# Patient Record
Sex: FEMALE | Race: WHITE | NOT HISPANIC OR LATINO | Employment: OTHER | URBAN - METROPOLITAN AREA
[De-identification: names, ages, dates, MRNs, and addresses within clinical notes are randomized per-mention and may not be internally consistent; named-entity substitution may affect disease eponyms.]

---

## 2023-05-04 PROBLEM — Z72.0 TOBACCO ABUSE: Status: ACTIVE | Noted: 2023-05-04

## 2023-05-04 PROBLEM — C51.9 VULVAR CANCER: Status: ACTIVE | Noted: 2023-05-04

## 2023-05-04 NOTE — PROGRESS NOTES
Ellie Rajan  [id number]  1960      Reason for visit: Vulvar cancer    Consultation:  Patient is being seen at the request of Dr. Sultana Sampson/[facility]    History of present illness:  The patient is a 62 y.o. year old female who presents today for treatment and evaluation of the above issues.    Patient presented to [facility] with complaints of vulvar pruritus and burning.  She had previously failed treatment for yeast infection and herpes.  Cultures for yeast, HSV, trichomonas, chlamydia, gonorrhea, bacterial vaginosis were all negative.  She had a history of condyloma acuminata with removal 40 years prior.  Patient underwent biopsy of vulvar lesion 2023 which showed at least squamous cell carcinoma in situ, suspicious for invasion.  There was strong p16 positivity and Ki-67 was 3+.  She requested a delay in referral and presents today for further evaluation.  On review of the note, there is a pedunculated left labial mass and a right labial fourchette lesion as well.  Patient does have a history of tobacco abuse but quit smoking 4 years ago.  She is very anxious today and accompanied by her 2 nieces.  She notes significant vulvar pain and burning which has not responded to any medications including a variety of topical medications.  She is not sure when this process started, but first saw her primary care for this in 2023.  She has been prescribed a variety of agents including antifungals and antibacterials without any improvement.  Nothing is helped her symptoms as well.  For new patients, [facility] intake form from today was reviewed and confirmed.    OBGYN History:  She is a .  She does not use HRT. She does not have a history of abnormal pap smears.  Last Pap smear was 4 years ago.  Menopause age 52.    Oncologic History:  Oncology/Hematology History    No history exists.         Past Medical History:   Diagnosis Date   • Anxiety    • Depression    • Fatigue    • GERD  "(gastroesophageal reflux disease)    • Hypertension    • Migraine        No past surgical history on file.    MEDICATIONS: The current medication list was reviewed with the patient and updated in the EMR this date per the Medical Assistant. Medication dosages and frequencies were confirmed to be accurate.      Allergies:  is allergic to sulfa antibiotics.    Social History:   Social History     Socioeconomic History   • Marital status: Single       Family History:    Family History   Problem Relation Age of Onset   • COPD Father    • Stroke Father    • Stroke Mother    • Hypertension Mother    • Urinary tract infection Mother    • Hypertension Brother    • COPD Sister        Health Maintenance:    Health Maintenance   Topic Date Due   • MAMMOGRAM  Never done   • COLORECTAL CANCER SCREENING  Never done   • ZOSTER VACCINE (1 of 2) Never done   • TDAP/TD VACCINES (2 - Tdap) 07/24/2013   • COVID-19 Vaccine (3 - Booster for Moderna series) 12/01/2021   • HEPATITIS C SCREENING  Never done   • ANNUAL PHYSICAL  Never done   • INFLUENZA VACCINE  08/01/2023   • Pneumococcal Vaccine 0-64  Aged Out     Review of Systems  Chronic fatigue, change in vision, poor dentition, palpitations, high blood pressure, occasional depression/anxiety, reflux, change in urination, back pain/joint pain, rash (candidiasis), easy bruising, headaches  Please refer to history of present illness for further review of systems.  Review of systems otherwise negative.    Physical Exam    Vitals:    05/05/23 1409   BP: 171/74   Pulse: 62   Resp: 18   Temp: 97.5 °F (36.4 °C)   TempSrc: Temporal   SpO2: 97%   Weight: 119 kg (261 lb 14.4 oz)   Height: 170.2 cm (67\")   PainSc:   8   PainLoc: Groin       Body mass index is 41.02 kg/m².  Wt Readings from Last 3 Encounters:   05/05/23 119 kg (261 lb 14.4 oz)     GENERAL: Alert, well-appearing female appearing her stated age who is in no apparent distress.   HEENT: Sclera anicteric. Head normocephalic, " atraumatic. Mucus membranes moist.   NECK: Trachea midline, supple, without masses.  No thyromegaly.   BREASTS: Deferred  CARDIOVASCULAR: Normal rate, regular rhythm, no murmurs, rubs, or gallops.  No peripheral edema.  RESPIRATORY: Clear to auscultation bilaterally, normal respiratory effort  BACK:  No CVA tenderness, no vertebral tenderness on palpation  GASTROINTESTINAL:  Abdomen is soft, non-tender, non-distended, no rebound or guarding, no masses, or hernias. No HSM.   SKIN:  Warm, dry, well-perfused.  All visible areas intact.  No rashes, lesions, ulcers.  PSYCHIATRIC: AO x3, with appropriate affect, normal thought processes.  NEUROLOGIC: No focal deficits.  Moves extremities well.  MUSCULOSKELETAL: Normal gait and station.   EXTREMITIES:   No cyanosis, clubbing, symmetric.  LYMPHATICS:  No cervical or inguinal adenopathy noted.     PELVIC exam:    Genitourinary:        Vulvar lesion at the right witching compasses the whole right labia minora extends to the labia majora and there is an ulcerative component at the right vaginal introitus which is not well visualized due to poor tolerance of examination.  Urethral meatus unable to be evaluated.    ECOG PS 1    PROCEDURES:  none    Diagnostic Data:    No Images in the past 120 days found.    Lab Results   Component Value Date    WBC 8.73 05/05/2023    HGB 13.2 05/05/2023    HCT 40.2 05/05/2023    MCV 88.5 05/05/2023     05/05/2023    NEUTROABS 5.20 05/05/2023    GLUCOSE 94 05/05/2023    BUN 16 05/05/2023    CREATININE 0.66 05/05/2023     05/05/2023    K 3.5 05/05/2023     05/05/2023    CO2 28.0 05/05/2023    CALCIUM 9.5 05/05/2023    ALBUMIN 4.4 05/05/2023    AST 23 05/05/2023    ALT 34 (H) 05/05/2023    BILITOT 0.3 05/05/2023     No results found for:       Assessment & Plan   This is a 62 y.o. woman with a locally advanced vulvar cancer, clinical staging not possible due to intolerance of exam  Encounter Diagnoses   Name Primary?   •  Vulvar cancer Yes   • Anxiety    PET/CT was ordered to further assess extent of disease.  Examination under under anesthesia with possible biopsy planned.  Going to coordinate care with radiation oncology due to concern that this might not be an operable cancer.  I discussed the possibility of radiation as primary treatment with the patient and her family.  She lives about 2 hours from [place] and generally prefers [place] to [place] due to difficulties with driving and level.  They prefer to get treatment in [place] if possible.  After examination under anesthesia and further work-up completed, will come up with a comprehensive treatment plan.  I did discuss this case with Dr. Fowler, radiation oncologist, who was available today.  [place] radiology would be a good option for the patient.  She might need radiation sensitizing chemotherapy as well.  This could be a challenge due to national platinum shortage with cisplatin and being utilized as a substitute for carboplatin.  However, this is all yet to be determined.  After further schedule coordination, exam under anesthesia to be performed with Dr. Manpreet Eugene with radiation oncology 5/15/2023.    Pain assessment was performed today as a part of patient’s care.  For patients with pain related to surgery, gynecologic malignancy or cancer treatment, the plan is as noted in the assessment/plan.  For patients with pain not related to these issues, they are to seek any further needed care from a more appropriate provider, such as PCP.      Orders Placed This Encounter   Procedures   • NM PET/CT Skull Base to Mid Thigh     Standing Status:   Future     Standing Expiration Date:   5/5/2024     Order Specific Question:   Release to patient     Answer:   Routine Release     Order Specific Question:   What radiopharmaceutical is preferred for this exam?     Answer:   FDG  (offered at all sites)   • Comprehensive Metabolic Panel     Standing Status:    Future     Number of Occurrences:   1     Standing Expiration Date:   5/5/2024     Order Specific Question:   Release to patient     Answer:   Routine Release   • Verify NPO Status     Standing Status:   Future   • Obtain Informed Consent     Standing Status:   Future     Order Specific Question:   Informed Consent Given For     Answer:   EXAM UNDER ANESTHESIA, POSSIBLE BIOPSY   • Provide Instructions to Patient on NPO Status     Standing Status:   Future   • Chlorhexidine Skin Prep     Chlorhexidine Skin Prep and Instructions For All Patients Having A Procedure Requiring an Outward Incision if Not Allergic. If Allergic, Give Antibacterial Skin Wipes and Instructions. Do Not Use For Facial Cases or on Any Mucus Membranes.     Standing Status:   Future   • Instruct Patient on Coughing, Deep Breathing & Incentive Spirometry     Standing Status:   Future   • ECG 12 Lead     Standing Status:   Future     Standing Expiration Date:   5/5/2024     Order Specific Question:   Reason for Exam:     Answer:   PRE-OP     Order Specific Question:   Release to patient     Answer:   Routine Release   • CBC & Differential     Standing Status:   Future     Number of Occurrences:   1     Standing Expiration Date:   5/5/2024     Order Specific Question:   Manual Differential     Answer:   No     Order Specific Question:   Release to patient     Answer:   Routine Release       FOLLOW UP: Examination under anesthesia.    I spent 60 minutes caring for Ellie on this date of service. This time includes time spent by me in the following activities: preparing for the visit, reviewing tests, performing a medically appropriate examination and/or evaluation, counseling and educating the patient/family/caregiver, ordering medications, tests, or procedures, referring and communicating with other health care professionals, documenting information in the medical record and care coordination    Electronically Signed by: Maxine Sanches MD  Date:  5/5/2023        done today

## 2023-05-04 NOTE — H&P (VIEW-ONLY)
Ellie Rajan  [id number]  1960      Reason for visit: Vulvar cancer    Consultation:  Patient is being seen at the request of Dr. Sultana Sampson/[facility]    History of present illness:  The patient is a 62 y.o. year old female who presents today for treatment and evaluation of the above issues.    Patient presented to [facility] with complaints of vulvar pruritus and burning.  She had previously failed treatment for yeast infection and herpes.  Cultures for yeast, HSV, trichomonas, chlamydia, gonorrhea, bacterial vaginosis were all negative.  She had a history of condyloma acuminata with removal 40 years prior.  Patient underwent biopsy of vulvar lesion 2023 which showed at least squamous cell carcinoma in situ, suspicious for invasion.  There was strong p16 positivity and Ki-67 was 3+.  She requested a delay in referral and presents today for further evaluation.  On review of the note, there is a pedunculated left labial mass and a right labial fourchette lesion as well.  Patient does have a history of tobacco abuse but quit smoking 4 years ago.  She is very anxious today and accompanied by her 2 nieces.  She notes significant vulvar pain and burning which has not responded to any medications including a variety of topical medications.  She is not sure when this process started, but first saw her primary care for this in 2023.  She has been prescribed a variety of agents including antifungals and antibacterials without any improvement.  Nothing is helped her symptoms as well.  For new patients, [facility] intake form from today was reviewed and confirmed.    OBGYN History:  She is a .  She does not use HRT. She does not have a history of abnormal pap smears.  Last Pap smear was 4 years ago.  Menopause age 52.    Oncologic History:  Oncology/Hematology History    No history exists.         Past Medical History:   Diagnosis Date   • Anxiety    • Depression    • Fatigue    • GERD  "(gastroesophageal reflux disease)    • Hypertension    • Migraine        No past surgical history on file.    MEDICATIONS: The current medication list was reviewed with the patient and updated in the EMR this date per the Medical Assistant. Medication dosages and frequencies were confirmed to be accurate.      Allergies:  is allergic to sulfa antibiotics.    Social History:   Social History     Socioeconomic History   • Marital status: Single       Family History:    Family History   Problem Relation Age of Onset   • COPD Father    • Stroke Father    • Stroke Mother    • Hypertension Mother    • Urinary tract infection Mother    • Hypertension Brother    • COPD Sister        Health Maintenance:    Health Maintenance   Topic Date Due   • MAMMOGRAM  Never done   • COLORECTAL CANCER SCREENING  Never done   • ZOSTER VACCINE (1 of 2) Never done   • TDAP/TD VACCINES (2 - Tdap) 07/24/2013   • COVID-19 Vaccine (3 - Booster for Moderna series) 12/01/2021   • HEPATITIS C SCREENING  Never done   • ANNUAL PHYSICAL  Never done   • INFLUENZA VACCINE  08/01/2023   • Pneumococcal Vaccine 0-64  Aged Out     Review of Systems  Chronic fatigue, change in vision, poor dentition, palpitations, high blood pressure, occasional depression/anxiety, reflux, change in urination, back pain/joint pain, rash (candidiasis), easy bruising, headaches  Please refer to history of present illness for further review of systems.  Review of systems otherwise negative.    Physical Exam    Vitals:    05/05/23 1409   BP: 171/74   Pulse: 62   Resp: 18   Temp: 97.5 °F (36.4 °C)   TempSrc: Temporal   SpO2: 97%   Weight: 119 kg (261 lb 14.4 oz)   Height: 170.2 cm (67\")   PainSc:   8   PainLoc: Groin       Body mass index is 41.02 kg/m².  Wt Readings from Last 3 Encounters:   05/05/23 119 kg (261 lb 14.4 oz)     GENERAL: Alert, well-appearing female appearing her stated age who is in no apparent distress.   HEENT: Sclera anicteric. Head normocephalic, " atraumatic. Mucus membranes moist.   NECK: Trachea midline, supple, without masses.  No thyromegaly.   BREASTS: Deferred  CARDIOVASCULAR: Normal rate, regular rhythm, no murmurs, rubs, or gallops.  No peripheral edema.  RESPIRATORY: Clear to auscultation bilaterally, normal respiratory effort  BACK:  No CVA tenderness, no vertebral tenderness on palpation  GASTROINTESTINAL:  Abdomen is soft, non-tender, non-distended, no rebound or guarding, no masses, or hernias. No HSM.   SKIN:  Warm, dry, well-perfused.  All visible areas intact.  No rashes, lesions, ulcers.  PSYCHIATRIC: AO x3, with appropriate affect, normal thought processes.  NEUROLOGIC: No focal deficits.  Moves extremities well.  MUSCULOSKELETAL: Normal gait and station.   EXTREMITIES:   No cyanosis, clubbing, symmetric.  LYMPHATICS:  No cervical or inguinal adenopathy noted.     PELVIC exam:    Genitourinary:        Vulvar lesion at the right witching compasses the whole right labia minora extends to the labia majora and there is an ulcerative component at the right vaginal introitus which is not well visualized due to poor tolerance of examination.  Urethral meatus unable to be evaluated.    ECOG PS 1    PROCEDURES:  none    Diagnostic Data:    No Images in the past 120 days found.    Lab Results   Component Value Date    WBC 8.73 05/05/2023    HGB 13.2 05/05/2023    HCT 40.2 05/05/2023    MCV 88.5 05/05/2023     05/05/2023    NEUTROABS 5.20 05/05/2023    GLUCOSE 94 05/05/2023    BUN 16 05/05/2023    CREATININE 0.66 05/05/2023     05/05/2023    K 3.5 05/05/2023     05/05/2023    CO2 28.0 05/05/2023    CALCIUM 9.5 05/05/2023    ALBUMIN 4.4 05/05/2023    AST 23 05/05/2023    ALT 34 (H) 05/05/2023    BILITOT 0.3 05/05/2023     No results found for:       Assessment & Plan   This is a 62 y.o. woman with a locally advanced vulvar cancer, clinical staging not possible due to intolerance of exam  Encounter Diagnoses   Name Primary?   •  Vulvar cancer Yes   • Anxiety    PET/CT was ordered to further assess extent of disease.  Examination under under anesthesia with possible biopsy planned.  Going to coordinate care with radiation oncology due to concern that this might not be an operable cancer.  I discussed the possibility of radiation as primary treatment with the patient and her family.  She lives about 2 hours from [place] and generally prefers [place] to [place] due to difficulties with driving and level.  They prefer to get treatment in [place] if possible.  After examination under anesthesia and further work-up completed, will come up with a comprehensive treatment plan.  I did discuss this case with Dr. Fowler, radiation oncologist, who was available today.  [place] radiology would be a good option for the patient.  She might need radiation sensitizing chemotherapy as well.  This could be a challenge due to national platinum shortage with cisplatin and being utilized as a substitute for carboplatin.  However, this is all yet to be determined.  After further schedule coordination, exam under anesthesia to be performed with Dr. Manpreet Eugene with radiation oncology 5/15/2023.    Pain assessment was performed today as a part of patient’s care.  For patients with pain related to surgery, gynecologic malignancy or cancer treatment, the plan is as noted in the assessment/plan.  For patients with pain not related to these issues, they are to seek any further needed care from a more appropriate provider, such as PCP.      Orders Placed This Encounter   Procedures   • NM PET/CT Skull Base to Mid Thigh     Standing Status:   Future     Standing Expiration Date:   5/5/2024     Order Specific Question:   Release to patient     Answer:   Routine Release     Order Specific Question:   What radiopharmaceutical is preferred for this exam?     Answer:   FDG  (offered at all sites)   • Comprehensive Metabolic Panel     Standing Status:    Future     Number of Occurrences:   1     Standing Expiration Date:   5/5/2024     Order Specific Question:   Release to patient     Answer:   Routine Release   • Verify NPO Status     Standing Status:   Future   • Obtain Informed Consent     Standing Status:   Future     Order Specific Question:   Informed Consent Given For     Answer:   EXAM UNDER ANESTHESIA, POSSIBLE BIOPSY   • Provide Instructions to Patient on NPO Status     Standing Status:   Future   • Chlorhexidine Skin Prep     Chlorhexidine Skin Prep and Instructions For All Patients Having A Procedure Requiring an Outward Incision if Not Allergic. If Allergic, Give Antibacterial Skin Wipes and Instructions. Do Not Use For Facial Cases or on Any Mucus Membranes.     Standing Status:   Future   • Instruct Patient on Coughing, Deep Breathing & Incentive Spirometry     Standing Status:   Future   • ECG 12 Lead     Standing Status:   Future     Standing Expiration Date:   5/5/2024     Order Specific Question:   Reason for Exam:     Answer:   PRE-OP     Order Specific Question:   Release to patient     Answer:   Routine Release   • CBC & Differential     Standing Status:   Future     Number of Occurrences:   1     Standing Expiration Date:   5/5/2024     Order Specific Question:   Manual Differential     Answer:   No     Order Specific Question:   Release to patient     Answer:   Routine Release       FOLLOW UP: Examination under anesthesia.    I spent 60 minutes caring for Ellie on this date of service. This time includes time spent by me in the following activities: preparing for the visit, reviewing tests, performing a medically appropriate examination and/or evaluation, counseling and educating the patient/family/caregiver, ordering medications, tests, or procedures, referring and communicating with other health care professionals, documenting information in the medical record and care coordination    Electronically Signed by: Maxine Sanches MD  Date:  5/5/2023

## 2023-05-05 ENCOUNTER — OFFICE VISIT (OUTPATIENT)
Dept: GYNECOLOGIC ONCOLOGY | Facility: CLINIC | Age: 63
End: 2023-05-05
Payer: MEDICAID

## 2023-05-05 ENCOUNTER — LAB (OUTPATIENT)
Dept: LAB | Facility: HOSPITAL | Age: 63
End: 2023-05-05
Payer: MEDICAID

## 2023-05-05 VITALS
DIASTOLIC BLOOD PRESSURE: 74 MMHG | HEIGHT: 67 IN | BODY MASS INDEX: 41.11 KG/M2 | WEIGHT: 261.9 LBS | TEMPERATURE: 97.5 F | HEART RATE: 62 BPM | RESPIRATION RATE: 18 BRPM | OXYGEN SATURATION: 97 % | SYSTOLIC BLOOD PRESSURE: 171 MMHG

## 2023-05-05 DIAGNOSIS — F41.9 ANXIETY: ICD-10-CM

## 2023-05-05 DIAGNOSIS — F41.9 ANXIETY: Primary | ICD-10-CM

## 2023-05-05 DIAGNOSIS — C51.9 VULVAR CANCER: Primary | ICD-10-CM

## 2023-05-05 DIAGNOSIS — C51.9 VULVAR CANCER: ICD-10-CM

## 2023-05-05 LAB
ALBUMIN SERPL-MCNC: 4.4 G/DL (ref 3.5–5.2)
ALBUMIN/GLOB SERPL: 1.5 G/DL
ALP SERPL-CCNC: 65 U/L (ref 39–117)
ALT SERPL W P-5'-P-CCNC: 34 U/L (ref 1–33)
ANION GAP SERPL CALCULATED.3IONS-SCNC: 10 MMOL/L (ref 5–15)
AST SERPL-CCNC: 23 U/L (ref 1–32)
BASOPHILS # BLD AUTO: 0.06 10*3/MM3 (ref 0–0.2)
BASOPHILS NFR BLD AUTO: 0.7 % (ref 0–1.5)
BILIRUB SERPL-MCNC: 0.3 MG/DL (ref 0–1.2)
BUN SERPL-MCNC: 16 MG/DL (ref 8–23)
BUN/CREAT SERPL: 24.2 (ref 7–25)
CALCIUM SPEC-SCNC: 9.5 MG/DL (ref 8.6–10.5)
CHLORIDE SERPL-SCNC: 100 MMOL/L (ref 98–107)
CO2 SERPL-SCNC: 28 MMOL/L (ref 22–29)
CREAT SERPL-MCNC: 0.66 MG/DL (ref 0.57–1)
DEPRECATED RDW RBC AUTO: 43.6 FL (ref 37–54)
EGFRCR SERPLBLD CKD-EPI 2021: 100.6 ML/MIN/1.73
EOSINOPHIL # BLD AUTO: 0.11 10*3/MM3 (ref 0–0.4)
EOSINOPHIL NFR BLD AUTO: 1.3 % (ref 0.3–6.2)
ERYTHROCYTE [DISTWIDTH] IN BLOOD BY AUTOMATED COUNT: 13.4 % (ref 12.3–15.4)
GLOBULIN UR ELPH-MCNC: 3 GM/DL
GLUCOSE SERPL-MCNC: 94 MG/DL (ref 65–99)
HCT VFR BLD AUTO: 40.2 % (ref 34–46.6)
HGB BLD-MCNC: 13.2 G/DL (ref 12–15.9)
IMM GRANULOCYTES # BLD AUTO: 0.09 10*3/MM3 (ref 0–0.05)
IMM GRANULOCYTES NFR BLD AUTO: 1 % (ref 0–0.5)
LYMPHOCYTES # BLD AUTO: 2.55 10*3/MM3 (ref 0.7–3.1)
LYMPHOCYTES NFR BLD AUTO: 29.2 % (ref 19.6–45.3)
MCH RBC QN AUTO: 29.1 PG (ref 26.6–33)
MCHC RBC AUTO-ENTMCNC: 32.8 G/DL (ref 31.5–35.7)
MCV RBC AUTO: 88.5 FL (ref 79–97)
MONOCYTES # BLD AUTO: 0.72 10*3/MM3 (ref 0.1–0.9)
MONOCYTES NFR BLD AUTO: 8.2 % (ref 5–12)
NEUTROPHILS NFR BLD AUTO: 5.2 10*3/MM3 (ref 1.7–7)
NEUTROPHILS NFR BLD AUTO: 59.6 % (ref 42.7–76)
NRBC BLD AUTO-RTO: 0 /100 WBC (ref 0–0.2)
PLATELET # BLD AUTO: 241 10*3/MM3 (ref 140–450)
PMV BLD AUTO: 10.8 FL (ref 6–12)
POTASSIUM SERPL-SCNC: 3.5 MMOL/L (ref 3.5–5.2)
PROT SERPL-MCNC: 7.4 G/DL (ref 6–8.5)
RBC # BLD AUTO: 4.54 10*6/MM3 (ref 3.77–5.28)
SODIUM SERPL-SCNC: 138 MMOL/L (ref 136–145)
WBC NRBC COR # BLD: 8.73 10*3/MM3 (ref 3.4–10.8)

## 2023-05-05 PROCEDURE — 80053 COMPREHEN METABOLIC PANEL: CPT

## 2023-05-05 PROCEDURE — 85025 COMPLETE CBC W/AUTO DIFF WBC: CPT

## 2023-05-05 PROCEDURE — 36415 COLL VENOUS BLD VENIPUNCTURE: CPT

## 2023-05-05 RX ORDER — IBUPROFEN 800 MG/1
800 TABLET ORAL 2 TIMES DAILY PRN
COMMUNITY
Start: 2023-03-10

## 2023-05-05 RX ORDER — HEPARIN SODIUM 5000 [USP'U]/ML
5000 INJECTION, SOLUTION INTRAVENOUS; SUBCUTANEOUS ONCE
OUTPATIENT
Start: 2023-05-05 | End: 2023-05-05

## 2023-05-05 RX ORDER — SODIUM CHLORIDE 0.9 % (FLUSH) 0.9 %
10 SYRINGE (ML) INJECTION EVERY 12 HOURS SCHEDULED
OUTPATIENT
Start: 2023-05-05

## 2023-05-05 RX ORDER — CHLORAL HYDRATE 500 MG
2 CAPSULE ORAL EVERY 12 HOURS SCHEDULED
COMMUNITY
Start: 2023-02-06

## 2023-05-05 RX ORDER — SODIUM CHLORIDE 0.9 % (FLUSH) 0.9 %
10 SYRINGE (ML) INJECTION AS NEEDED
OUTPATIENT
Start: 2023-05-05

## 2023-05-05 RX ORDER — NYSTATIN 100000 [USP'U]/G
POWDER TOPICAL
COMMUNITY
Start: 2023-04-03

## 2023-05-05 RX ORDER — PREGABALIN 75 MG/1
150 CAPSULE ORAL ONCE
OUTPATIENT
Start: 2023-05-05 | End: 2023-05-05

## 2023-05-05 RX ORDER — HYDROCHLOROTHIAZIDE 12.5 MG/1
1 TABLET ORAL DAILY
COMMUNITY
Start: 2023-03-10

## 2023-05-05 RX ORDER — ACETAMINOPHEN 500 MG
1000 TABLET ORAL ONCE
OUTPATIENT
Start: 2023-05-05 | End: 2023-05-05

## 2023-05-05 RX ORDER — SODIUM CHLORIDE 9 MG/ML
40 INJECTION, SOLUTION INTRAVENOUS AS NEEDED
OUTPATIENT
Start: 2023-05-05

## 2023-05-05 RX ORDER — CELECOXIB 100 MG/1
200 CAPSULE ORAL ONCE
OUTPATIENT
Start: 2023-05-05 | End: 2023-05-05

## 2023-05-05 RX ORDER — ESCITALOPRAM OXALATE 10 MG/1
1 TABLET ORAL DAILY
COMMUNITY
Start: 2023-04-04

## 2023-05-05 RX ORDER — LORAZEPAM 1 MG/1
1 TABLET ORAL EVERY 8 HOURS PRN
Qty: 1 TABLET | Refills: 0 | Status: SHIPPED | OUTPATIENT
Start: 2023-05-05

## 2023-05-05 RX ORDER — OXYCODONE HYDROCHLORIDE 5 MG/1
5 TABLET ORAL ONCE
OUTPATIENT
Start: 2023-05-05 | End: 2023-05-05

## 2023-05-05 RX ORDER — VALACYCLOVIR HYDROCHLORIDE 1 G/1
1 TABLET, FILM COATED ORAL 3 TIMES DAILY
COMMUNITY
Start: 2023-03-25

## 2023-05-05 RX ORDER — ATORVASTATIN CALCIUM 20 MG/1
20 TABLET, FILM COATED ORAL
COMMUNITY
Start: 2023-04-21

## 2023-05-05 RX ORDER — LORAZEPAM 1 MG/1
1 TABLET ORAL EVERY 8 HOURS PRN
Qty: 1 TABLET | Refills: 0 | Status: CANCELLED | OUTPATIENT
Start: 2023-05-05 | End: 2023-05-06

## 2023-05-05 RX ORDER — LEVOTHYROXINE SODIUM 0.03 MG/1
1 TABLET ORAL DAILY
COMMUNITY
Start: 2023-04-20

## 2023-05-05 NOTE — PATIENT INSTRUCTIONS
Surgery Instructions            Ellie Rajan  [id number]  1960      SURGEON:  Myron    Surgery Coordinator: Laura HERR    Gynecological Oncology  [address] [facility] [address] [place] [address], [facility]  Phone: [phone]                   Fax: [phone]      Pre-Admission Testing Appointment      We understand your time is valuable. To prevent delays, please bring the following to your PAT apt. if it applies to you:  Written physician orders (if given to you by your physician)  All medications in the original bottles including over-the-counter medications (not a list)  Copy of living will or power of  documents   Copy of recent test results (EKG, stress test, echo, heart cath, etc.)  Copy of pacemaker or ICD cards and date of last interrogation   Copy of cardiac clearance letter from your cardiologist or primary care physician if history of heart problems  Name and phone number of your pharmacy, primary care physician and/or cardiologist  CPAP or BiPAP settings    Surgery Appointment     Your surgery has been scheduled on 5/15/2023.  You will need to go to Main Registration to check in at 0730. Then you will be sent to the [address] [place] [address] floor surgery registration desk to check in.    Nothing by mouth after midnight on 5/14/2023.    If you are feeling sick, have a fever or cough and have seen your PCP let our office know 48 hours prior to surgery. It may be subject to rescheduling.       The Day of Surgery:    Do not chew gum or tobacco, smoke, or eat mints or hard candy. Shower and wash your hair. You may brush your teeth but do not swallow water. Use any wipes that Pre-admission testing has given you.     Please arrive for surgery as instructed by the pre-op nurse, often one to two hours before your surgery.  Once you are called to go to your pre-op room, no one will be allowed in the pre op room.   Please note no one under age 12 is permitted to stay in the  waiting area without supervision.  Remove all jewelry, including rings and piercings. Do not bring valuables to the hospital.  Wear loose-fitting clothing.  Avoid wearing eye makeup or contact lenses  We make every effort to begin surgery at your scheduled start time but delays do occur. We will keep you and your family updated about any delays  Please note: you MUST have a  over the age of 18 to drive you home from the hospital. You may not use Uber, Lyft or a taxi.    Please remember to bring:    Photo ID and current medical insurance card  Advanced directives, living will or power of  (if applicable)  Current list of all medications, including over-the- counter and herbal supplements  List of allergies  CPAP device if you have sleep apnea  Any assistive devices or equipment needed after surgery    While You are In the Pre-Op Room:  The nurse will review your health history and will place an IV (into the vein) in your hand or arm for fluids and medicines.  An anesthesia provider will talk with you about anesthesia and pain control during and after surgery.  A member of the surgical team can answer your questions.    Directions to [facility]  [address] [facility] ? [place], [place] [address] ? [phone]    From [address] and [address] [facility]:  Take [address] to the [place] exit. Go right on [address] to [address]. Right on [address] to [facility].   Left on [facility] to [facility] which is on the left.    From [address] [facility]:  Get off [address] at the [place] exit. Go left on [address] to [address]. Right on [address] to [facility]. Left on   [facility] to [facility] [place] [facility] which is on the left.     From the South ([address]):  Follow [address] to approximately one mile inside [facility]. [facility] is on the right at [facility] and   [facility].     Parking:  Free  Parking -  Take Entrance 2 off of [address] and go straight ahead to [address] [place] [address].  Self Parking - Take Entrance 1 off of [address], bear left and follow the road to [facility].    Directions to Registration:  If entering through front of [address] [place] [address] ( parking), take a right and proceed up the hallway connecting [address] [place] [address] to   [facility]. Registration is on the left about nursing home up the lyon.    If entering from [facility], take garage elevator to first floor (1), exit to the right and proceed through the doors to outside, follow the covered sidewalk to entrance of [facility], follow signs to [address] [place] [address], this leads to the [facility] lobby and information desk. Proceed past the information desk to the hallway that connects [address] [place] [address] to the [facility]. Registration is on the left about nursing home up the ly[address].    Directions to Pre-admission Testing:  Follow directions to Registration and Pre-admission Testing is next door to Registration             PREPARING FOR SURGERY  **Disability or Work Release Forms     Work: The amount of time you will be off work after surgery depends on both your surgery and your job. Discuss this with your doctor before surgery. If you have any questions about this, call your doctor.  You must provide all forms completed and signed to the GYN ONCOLOGY office.    FORM FOR AUHTHORIZATION FOR USE AND/OR DISCLOSURE OF PROCTED HEALTH INFORMATION CAN BE PROVIDED UPON REQUEST.    Preoperative Evaluation and Optimization  If your doctor tells you to get a preoperative evaluation from your primary care provider, cardiologist, or other specialist, it is your responsibility to make sure to complete these well before your surgery. We want you to get evaluated to make sure you are as healthy as possible when you have your surgery. If the evaluation, including all recommended testing, is not done in time, your surgery will be postponed.    If you take diabetic medications please  "consult with the prescriber.  Continue antidepressants, Beta Blockers \"olol\", anti-seizure medication, GERD medication (heartburn), Opioids and Parkinson's medication.  Let us know if you have a history of blood clots or are taking a blood thinner before your surgery, this will need to be held and you will need to discuss this with staff.   You are allowed 1 visitor that may remain in the waiting room at both locations.  Visitors cannot come back to pre-op or post-op areas.    Please note: you MUST have a  over the age of 18 to drive you home from the hospital. You may not use Uber, Lyft or a taxi.    Physical Fitness  Research shows that getting more physical activity before surgery can lower your risk for problems after surgery. Walking is a great way to improve your fitness level before surgery. Even if you start walking just a few weeks before surgery, it can make a big difference.     Quit Smoking  If you smoke, your risk of having a lung problem is at least twice that of a non-smoker.    Surgical incisions will not heal as well and you have a higher risk of infection  The heart has to work harder.  It is best to quit smoking 6 to 8 weeks before surgery. This gives your lungs more time to recover.    Outpatient Surgery  You will need to have someone bring you to the hospital, stay in the waiting room during your procedure and take you home at discharge. It is recommended that someone stay with you 24 hours after your procedure.     If you live more than a 4-hour drive away from the hospital, or live in an area without easy access to an emergency department, we recommend you plan to spend another night or two close to the hospital before you go home. For assistance with hotel, prices and vouchers let our office know and we can let you talk with our Oncology Social worker, Althea Pires.     Post-Operative Visit  You will be scheduled a post-operative appointment for 3 weeks after your surgical procedure. " If you do not have an appointment please call the office and have that scheduled.     How to prevent nausea  The best way to prevent nausea is to eat frequent small meals. It is especially important to eat something before taking pain medication. Take your ondansetron if you are feeling nauseous do not wait.    Pain Management after Surgery    If you have kidney disease or liver disease and are not to take ibuprofen or Tylenol please let your doctor or nurse know.     Driving: Do not drive while you are taking prescription pain medications.     It is normal to have some pain after surgery. The goal of managing your acute pain after surgery is to minimize your pain so you feel comfortable enough to get up, take deep breaths, wash, get dressed, and do simple tasks in your home. Some discomfort is likely. We do not expect you to be completely free of pain.   Pain is usually worst the first 24-48 hours after surgery.    What can I do to relieve pain without medications?   Apply heat with a warm compress, hot water bottle, or heating pad. Do not put anything hot directly on your skin or lie on top of it.   Apply cooling with a cold gel pack, bag of peas, or crushed ice. Wrap in a soft cloth or towel.   Do not push or press on your incision. It is normal for your incision to be sore for up to 6 weeks if you push on it.   Unless your doctor gives you a different plan, ibuprofen and acetaminophen are the main medicines you will use to manage your pain.   You may also get a prescription for an opioid such as oxycodone or hydrocodone. Opioids should only be added as needed to reduce pain that is not adequately relieved by ibuprofen and acetaminophen.                                                                                         Typical Pain Medication Schedule  6 am Ibuprofen 600 mg   9 am acetaminophen 650 mg   12:00 pm Noon Ibuprofen 600 mg   3:00 pm Acetaminophen 650 mg   6:00 pm Ibuprofen 600 mg   9:00 pm  Acetaminophen 650 mg   12:00 am Midnight  Ibuprofen 600 mg.      What if this schedule does not control my pain?   Please call the office and let us know at [phone]  Reduce the number and frequency of opioids as soon as you can. Do not take more opioid medication than your doctor has prescribed.   Common side effects and risks of opioids include drowsiness, mental confusion, dizziness, nausea, constipation, itching, dry mouth, and slowed breathing.   Never mix opioids with alcohol, sleep aids or anti-anxiety medications. These are dangerous combinations that increase the harmful effects of opioid pain medication. Many overdose deaths from opioids also involve at least one other drug or alcohol.   It is illegal to sell or share an opioid without a prescription properly issued by a licensed health care prescriber.    What is the best way to stop taking pain medications?  1. Stop opioid use.  2. Stop acetaminophen.  3. Gradually decrease how often you take ibuprofen. It is a good idea to take a 600 mg pill before you start a more tiring activity such as going shopping or for a long walk.  Once you get more active, you may have a day when your pain gets a little worse. If this happens, take ibuprofen. If ibuprofen does not relieve the pain, add acetaminophen.    What do I need to know about bowel movements?   Starting as soon as you get home, take 17 grams of Miralax (one capful) twice a day to keep your stool soft and prevent constipation. It is important to prevent constipation because straining can damage your stitches. Your stool should be as soft as toothpaste. If your stool gets too loose, cut back to using Miralax only once a day.   If you used a bowel prep before surgery, it is common not to have a bowel movement on the first and second day after surgery.   If you have not had a bowel movement by 7 p.m. on the third day after surgery, do one of the following at bedtime:  Drink 1 ounce (2 tablespoons) of  Milk of Magnesia (MOM). If you have used MOM before and know you need to take 2 ounces for it to work for you, it is OK to do this, or Take 2 Senekot tablets.   Go for short walks. Walking and being active will help you have a bowel movement.   If you have not had a bowel movement by noon on the fourth day after surgery, call the clinic where you were seen and ask to speak with a nurse.    What kind of vaginal bleeding is normal?  Spotting of pink or red blood from the vagina is normal. Brown-colored discharge that gradually changes to a light yellow or cream color is also normal and can last up to 6 weeks. The brownish discharge is old blood and often has a strong odor, this is okay. Call us if it becomes heavier or foul smelling or you are saturating a maxi pad within an hour.     At Home after Surgery: If you experience a medical emergency call 911 or have someone drive you to your nearest emergency department.     When should I call my doctor?  Call your doctor right away, any time of the day or night, including on weekends and holidays, if you have any of the following signs or symptoms:   A temperature over 100.4°F (38°C) If you don't have one, please buy a thermometer before your surgery.   Heavy bleeding (soaking a regular pad in an hour or less)   Severe pain in your abdomen or pelvis that the pain medication is not helping   Chest pain or difficulty breathing   Swelling, redness, or pain in your legs   An incision that opens   An incision that is red or hot   Fluid or blood leaking from an incision   New bruising after leaving the hospital that is large or spreading. A little bit of bruising around an incision is normal.   Nausea and vomiting    Skin rash   Unable to urinate at all   Pain or stinging when you pass urine   Blood or cloudiness in your urine   Non-stop urge to pass urine, but only dribbling when you try to go   A sense that something is wrong.    Caring for post-surgical incisions     Do not  have vaginal intercourse until your doctor evaluates you at a postop visit and tells you OK.     Showers: You may shower starting 24 hours after your surgery.    NO BATHS: do not take a tub bath up to 6 weeks after surgery.   Do not put any lotion, oil, gel, or powder on or near your incisions.     For incisions inside your vagina: Incisions inside the vagina are closed with dissolvable stitches. When they dissolve you may see little bits of suture material that look like thin pieces of string on your underwear or on toilet tissue after wiping. This is normal. Do not put anything inside the vagina until your doctor evaluates you at a postop visit and tells you when it will be OK.      For incisions on your skin: If there is a dressing over the incision, remove it before your first shower. Leave the slim adhesive strips that are under the dressing in place. During the week after surgery, they will usually curl up at the edges and then come off on their own. If they are still there a week after surgery, gently remove them.  To clean the incisions, first wash your hands, and then get your hands sudsy with soap and gently wash or let the sudsy water run down over the incisions. Dry the incisions well after washing by gently patting with a towel. You may use a blow dryer, but it must be on a low-heat setting.    When will my bladder function get back to normal?   You received extra fluid through your I.V. while you were in the hospital, so it is normal to urinate (pee) more than usual when you first get home.   It is normal for your bladder function to be different after surgery. You may notice a pause before your urine stream starts or that your urine stream is slower. This will gradually get better, but it may take up to 6 months before you are back to normal. Be patient, relax, and sit on the toilet a little longer.   Drinking more water than usual will not help the bladder recover faster.    What is a normal energy  level?  It is normal to have a decreased energy level after surgery. Listen to your body. If you need to rest, do it. Give yourself permission to take it easy. Once you settle into a normal routine at home, you will find that you slowly begin to feel better. Walking around the house and taking short walks outside will help you get back to normal.    What kind of exercise/activities can I do?   Exercise is important for a healthy recovery. We encourage you to begin normal physical activity, like walking, within hours of surgery. Start with short walks and gradually increase the distance and length of time that you walk.   Allow your body time to heal. Do not restart a difficult exercise routine until you have had your post-op exam and your doctor says it is OK.   Lifting: Unless you are given other instructions, for 6 weeks after your surgery do not lift anything over 15 pounds.   Travel: It is best if you do not go far away from home before your postop visit with your doctor. If you have travel plans, talk to your doctor about this before your surgery.      Financial Assistance:    If you have any questions or need assistance, contact your [facility] financial counseling office from 8:30 a.m.-4:30  p.m. Monday through Friday. Closed weekends.   [place]: [phone] or, or visit at [address] [facility], [address], near the entrance.  Financial Assistance Application available upon request      Patient Payments and Correspondence  Customer service representatives are available to assist you from 8:00 a.m. to 6:00 p.m. Eastern Standard Time by calling [phone] Monday through Friday. You can also contact us through [employer].    [facility]  [id number]  [place] [address] [phone]

## 2023-05-08 NOTE — SIGNIFICANT NOTE
Database initiated. Pt unable to do a med rec during call. I instructed to bring pictures of med bottles to preop

## 2023-05-10 ENCOUNTER — TELEPHONE (OUTPATIENT)
Dept: GYNECOLOGIC ONCOLOGY | Facility: CLINIC | Age: 63
End: 2023-05-10
Payer: MEDICAID

## 2023-05-10 DIAGNOSIS — C51.8 MALIGNANT NEOPLASM OF OVERLAPPING SITES OF VULVA: Primary | ICD-10-CM

## 2023-05-10 NOTE — TELEPHONE ENCOUNTER
Disability Determination Paperwork received 05/01/2023 with 1st appt with provider scheduled for May 5, 2023. Requested information faxed to requested number on 05/09/2023 and place in scan folder for addition to patients medical records.

## 2023-05-14 ENCOUNTER — ANESTHESIA EVENT (OUTPATIENT)
Dept: PERIOP | Facility: HOSPITAL | Age: 63
End: 2023-05-14
Payer: MEDICAID

## 2023-05-14 RX ORDER — FAMOTIDINE 10 MG/ML
20 INJECTION, SOLUTION INTRAVENOUS ONCE
Status: CANCELLED | OUTPATIENT
Start: 2023-05-14 | End: 2023-05-14

## 2023-05-15 ENCOUNTER — ANESTHESIA (OUTPATIENT)
Dept: PERIOP | Facility: HOSPITAL | Age: 63
End: 2023-05-15
Payer: MEDICAID

## 2023-05-15 ENCOUNTER — HOSPITAL ENCOUNTER (OUTPATIENT)
Facility: HOSPITAL | Age: 63
Discharge: HOME | End: 2023-05-15
Attending: OBSTETRICS & GYNECOLOGY | Admitting: OBSTETRICS & GYNECOLOGY
Payer: MEDICAID

## 2023-05-15 VITALS
TEMPERATURE: 97.5 F | WEIGHT: 261 LBS | HEART RATE: 56 BPM | BODY MASS INDEX: 40.97 KG/M2 | DIASTOLIC BLOOD PRESSURE: 66 MMHG | RESPIRATION RATE: 18 BRPM | SYSTOLIC BLOOD PRESSURE: 136 MMHG | OXYGEN SATURATION: 92 % | HEIGHT: 67 IN

## 2023-05-15 DIAGNOSIS — C51.9 VULVAR CANCER: ICD-10-CM

## 2023-05-15 LAB
QT INTERVAL: 462 MS
QTC INTERVAL: 472 MS

## 2023-05-15 PROCEDURE — 25010000002 FENTANYL CITRATE (PF) 50 MCG/ML SOLUTION

## 2023-05-15 PROCEDURE — 88321 CONSLTJ&REPRT SLD PREP ELSWR: CPT

## 2023-05-15 PROCEDURE — 25010000002 DEXAMETHASONE PER 1 MG: Performed by: NURSE ANESTHETIST, CERTIFIED REGISTERED

## 2023-05-15 PROCEDURE — 88305 TISSUE EXAM BY PATHOLOGIST: CPT | Performed by: OBSTETRICS & GYNECOLOGY

## 2023-05-15 PROCEDURE — 88342 IMHCHEM/IMCYTCHM 1ST ANTB: CPT | Performed by: OBSTETRICS & GYNECOLOGY

## 2023-05-15 PROCEDURE — 88360 TUMOR IMMUNOHISTOCHEM/MANUAL: CPT | Performed by: OBSTETRICS & GYNECOLOGY

## 2023-05-15 PROCEDURE — 25010000002 ONDANSETRON PER 1 MG: Performed by: NURSE ANESTHETIST, CERTIFIED REGISTERED

## 2023-05-15 PROCEDURE — 25010000002 FENTANYL CITRATE (PF) 100 MCG/2ML SOLUTION: Performed by: NURSE ANESTHETIST, CERTIFIED REGISTERED

## 2023-05-15 PROCEDURE — 25010000002 CEFAZOLIN PER 500 MG: Performed by: NURSE ANESTHETIST, CERTIFIED REGISTERED

## 2023-05-15 PROCEDURE — 93005 ELECTROCARDIOGRAM TRACING: CPT | Performed by: ANESTHESIOLOGY

## 2023-05-15 PROCEDURE — 25010000002 PROPOFOL 10 MG/ML EMULSION: Performed by: NURSE ANESTHETIST, CERTIFIED REGISTERED

## 2023-05-15 PROCEDURE — 25010000002 SUGAMMADEX 200 MG/2ML SOLUTION: Performed by: NURSE ANESTHETIST, CERTIFIED REGISTERED

## 2023-05-15 RX ORDER — MAGNESIUM HYDROXIDE 1200 MG/15ML
LIQUID ORAL AS NEEDED
Status: DISCONTINUED | OUTPATIENT
Start: 2023-05-15 | End: 2023-05-15 | Stop reason: HOSPADM

## 2023-05-15 RX ORDER — CEFAZOLIN SODIUM 1 G/3ML
INJECTION, POWDER, FOR SOLUTION INTRAMUSCULAR; INTRAVENOUS AS NEEDED
Status: DISCONTINUED | OUTPATIENT
Start: 2023-05-15 | End: 2023-05-15 | Stop reason: SURG

## 2023-05-15 RX ORDER — OXYCODONE HYDROCHLORIDE 5 MG/1
5 TABLET ORAL ONCE
Status: COMPLETED | OUTPATIENT
Start: 2023-05-15 | End: 2023-05-15

## 2023-05-15 RX ORDER — ACETAMINOPHEN 500 MG
1000 TABLET ORAL ONCE
Status: COMPLETED | OUTPATIENT
Start: 2023-05-15 | End: 2023-05-15

## 2023-05-15 RX ORDER — FENTANYL CITRATE 50 UG/ML
50 INJECTION, SOLUTION INTRAMUSCULAR; INTRAVENOUS
Status: DISCONTINUED | OUTPATIENT
Start: 2023-05-15 | End: 2023-05-15 | Stop reason: HOSPADM

## 2023-05-15 RX ORDER — LIDOCAINE HYDROCHLORIDE 10 MG/ML
0.5 INJECTION, SOLUTION EPIDURAL; INFILTRATION; INTRACAUDAL; PERINEURAL ONCE AS NEEDED
Status: COMPLETED | OUTPATIENT
Start: 2023-05-15 | End: 2023-05-15

## 2023-05-15 RX ORDER — LIDOCAINE HYDROCHLORIDE 10 MG/ML
INJECTION, SOLUTION EPIDURAL; INFILTRATION; INTRACAUDAL; PERINEURAL AS NEEDED
Status: DISCONTINUED | OUTPATIENT
Start: 2023-05-15 | End: 2023-05-15 | Stop reason: SURG

## 2023-05-15 RX ORDER — ONDANSETRON 2 MG/ML
4 INJECTION INTRAMUSCULAR; INTRAVENOUS ONCE AS NEEDED
Status: DISCONTINUED | OUTPATIENT
Start: 2023-05-15 | End: 2023-05-15 | Stop reason: HOSPADM

## 2023-05-15 RX ORDER — IBUPROFEN 600 MG/1
600 TABLET ORAL EVERY 6 HOURS PRN
Qty: 30 TABLET | Refills: 1 | Status: SHIPPED | OUTPATIENT
Start: 2023-05-15

## 2023-05-15 RX ORDER — SODIUM CHLORIDE 9 MG/ML
40 INJECTION, SOLUTION INTRAVENOUS AS NEEDED
Status: DISCONTINUED | OUTPATIENT
Start: 2023-05-15 | End: 2023-05-15 | Stop reason: HOSPADM

## 2023-05-15 RX ORDER — SODIUM CHLORIDE, SODIUM LACTATE, POTASSIUM CHLORIDE, CALCIUM CHLORIDE 600; 310; 30; 20 MG/100ML; MG/100ML; MG/100ML; MG/100ML
INJECTION, SOLUTION INTRAVENOUS CONTINUOUS PRN
Status: DISCONTINUED | OUTPATIENT
Start: 2023-05-15 | End: 2023-05-15 | Stop reason: SURG

## 2023-05-15 RX ORDER — BUPIVACAINE HYDROCHLORIDE AND EPINEPHRINE 2.5; 5 MG/ML; UG/ML
INJECTION, SOLUTION EPIDURAL; INFILTRATION; INTRACAUDAL; PERINEURAL AS NEEDED
Status: DISCONTINUED | OUTPATIENT
Start: 2023-05-15 | End: 2023-05-15 | Stop reason: HOSPADM

## 2023-05-15 RX ORDER — NALOXONE HYDROCHLORIDE 4 MG/.1ML
SPRAY NASAL
Qty: 2 EACH | Refills: 0 | Status: SHIPPED | OUTPATIENT
Start: 2023-05-15

## 2023-05-15 RX ORDER — PROPOFOL 10 MG/ML
VIAL (ML) INTRAVENOUS AS NEEDED
Status: DISCONTINUED | OUTPATIENT
Start: 2023-05-15 | End: 2023-05-15 | Stop reason: SURG

## 2023-05-15 RX ORDER — ONDANSETRON 2 MG/ML
INJECTION INTRAMUSCULAR; INTRAVENOUS AS NEEDED
Status: DISCONTINUED | OUTPATIENT
Start: 2023-05-15 | End: 2023-05-15 | Stop reason: SURG

## 2023-05-15 RX ORDER — DEXAMETHASONE SODIUM PHOSPHATE 4 MG/ML
INJECTION, SOLUTION INTRA-ARTICULAR; INTRALESIONAL; INTRAMUSCULAR; INTRAVENOUS; SOFT TISSUE AS NEEDED
Status: DISCONTINUED | OUTPATIENT
Start: 2023-05-15 | End: 2023-05-15 | Stop reason: SURG

## 2023-05-15 RX ORDER — ROCURONIUM BROMIDE 10 MG/ML
INJECTION, SOLUTION INTRAVENOUS AS NEEDED
Status: DISCONTINUED | OUTPATIENT
Start: 2023-05-15 | End: 2023-05-15 | Stop reason: SURG

## 2023-05-15 RX ORDER — FENTANYL CITRATE 50 UG/ML
INJECTION, SOLUTION INTRAMUSCULAR; INTRAVENOUS AS NEEDED
Status: DISCONTINUED | OUTPATIENT
Start: 2023-05-15 | End: 2023-05-15 | Stop reason: SURG

## 2023-05-15 RX ORDER — FENTANYL CITRATE 50 UG/ML
INJECTION, SOLUTION INTRAMUSCULAR; INTRAVENOUS
Status: COMPLETED
Start: 2023-05-15 | End: 2023-05-15

## 2023-05-15 RX ORDER — SODIUM CHLORIDE 0.9 % (FLUSH) 0.9 %
10 SYRINGE (ML) INJECTION AS NEEDED
Status: DISCONTINUED | OUTPATIENT
Start: 2023-05-15 | End: 2023-05-15 | Stop reason: HOSPADM

## 2023-05-15 RX ORDER — SODIUM CHLORIDE 0.9 % (FLUSH) 0.9 %
10 SYRINGE (ML) INJECTION EVERY 12 HOURS SCHEDULED
Status: DISCONTINUED | OUTPATIENT
Start: 2023-05-15 | End: 2023-05-15 | Stop reason: HOSPADM

## 2023-05-15 RX ORDER — SODIUM CHLORIDE, SODIUM LACTATE, POTASSIUM CHLORIDE, CALCIUM CHLORIDE 600; 310; 30; 20 MG/100ML; MG/100ML; MG/100ML; MG/100ML
9 INJECTION, SOLUTION INTRAVENOUS CONTINUOUS
Status: DISCONTINUED | OUTPATIENT
Start: 2023-05-15 | End: 2023-05-15 | Stop reason: HOSPADM

## 2023-05-15 RX ORDER — HEPARIN SODIUM 5000 [USP'U]/ML
5000 INJECTION, SOLUTION INTRAVENOUS; SUBCUTANEOUS ONCE
Status: DISCONTINUED | OUTPATIENT
Start: 2023-05-15 | End: 2023-05-15 | Stop reason: HOSPADM

## 2023-05-15 RX ORDER — SUCCINYLCHOLINE/SOD CL,ISO/PF 200MG/10ML
SYRINGE (ML) INTRAVENOUS AS NEEDED
Status: DISCONTINUED | OUTPATIENT
Start: 2023-05-15 | End: 2023-05-15 | Stop reason: SURG

## 2023-05-15 RX ORDER — FAMOTIDINE 20 MG/1
20 TABLET, FILM COATED ORAL ONCE
Status: COMPLETED | OUTPATIENT
Start: 2023-05-15 | End: 2023-05-15

## 2023-05-15 RX ORDER — ACETAMINOPHEN 500 MG
500 TABLET ORAL EVERY 6 HOURS PRN
Qty: 30 TABLET | Refills: 2 | Status: SHIPPED | OUTPATIENT
Start: 2023-05-15

## 2023-05-15 RX ORDER — MIDAZOLAM HYDROCHLORIDE 1 MG/ML
1 INJECTION INTRAMUSCULAR; INTRAVENOUS
Status: DISCONTINUED | OUTPATIENT
Start: 2023-05-15 | End: 2023-05-15 | Stop reason: HOSPADM

## 2023-05-15 RX ORDER — PREGABALIN 150 MG/1
150 CAPSULE ORAL ONCE
Status: COMPLETED | OUTPATIENT
Start: 2023-05-15 | End: 2023-05-15

## 2023-05-15 RX ORDER — LABETALOL HYDROCHLORIDE 5 MG/ML
INJECTION, SOLUTION INTRAVENOUS AS NEEDED
Status: DISCONTINUED | OUTPATIENT
Start: 2023-05-15 | End: 2023-05-15 | Stop reason: SURG

## 2023-05-15 RX ORDER — OXYCODONE HYDROCHLORIDE 5 MG/1
5 TABLET ORAL EVERY 4 HOURS PRN
Qty: 5 TABLET | Refills: 0 | Status: SHIPPED | OUTPATIENT
Start: 2023-05-15

## 2023-05-15 RX ORDER — CELECOXIB 200 MG/1
200 CAPSULE ORAL ONCE
Status: COMPLETED | OUTPATIENT
Start: 2023-05-15 | End: 2023-05-15

## 2023-05-15 RX ADMIN — CEFAZOLIN SODIUM 2 G: 1 INJECTION, POWDER, FOR SOLUTION INTRAMUSCULAR; INTRAVENOUS at 09:37

## 2023-05-15 RX ADMIN — LABETALOL HYDROCHLORIDE 5 MG: 5 INJECTION, SOLUTION INTRAVENOUS at 09:46

## 2023-05-15 RX ADMIN — LIDOCAINE HYDROCHLORIDE 0.2 ML: 10 INJECTION, SOLUTION EPIDURAL; INFILTRATION; INTRACAUDAL; PERINEURAL at 07:31

## 2023-05-15 RX ADMIN — ROCURONIUM BROMIDE 20 MG: 10 SOLUTION INTRAVENOUS at 09:40

## 2023-05-15 RX ADMIN — FENTANYL CITRATE 100 MCG: 50 INJECTION, SOLUTION INTRAMUSCULAR; INTRAVENOUS at 09:29

## 2023-05-15 RX ADMIN — SUGAMMADEX 200 MG: 100 INJECTION, SOLUTION INTRAVENOUS at 09:56

## 2023-05-15 RX ADMIN — FAMOTIDINE 20 MG: 20 TABLET ORAL at 07:50

## 2023-05-15 RX ADMIN — LIDOCAINE HYDROCHLORIDE 50 MG: 10 INJECTION, SOLUTION EPIDURAL; INFILTRATION; INTRACAUDAL; PERINEURAL at 09:29

## 2023-05-15 RX ADMIN — DEXAMETHASONE SODIUM PHOSPHATE 4 MG: 4 INJECTION, SOLUTION INTRAMUSCULAR; INTRAVENOUS at 09:34

## 2023-05-15 RX ADMIN — FENTANYL CITRATE 50 MCG: 50 INJECTION, SOLUTION INTRAMUSCULAR; INTRAVENOUS at 10:45

## 2023-05-15 RX ADMIN — ACETAMINOPHEN 1000 MG: 500 TABLET ORAL at 07:50

## 2023-05-15 RX ADMIN — PREGABALIN 150 MG: 150 CAPSULE ORAL at 07:49

## 2023-05-15 RX ADMIN — ROCURONIUM BROMIDE 10 MG: 10 SOLUTION INTRAVENOUS at 09:29

## 2023-05-15 RX ADMIN — SODIUM CHLORIDE, POTASSIUM CHLORIDE, SODIUM LACTATE AND CALCIUM CHLORIDE: 600; 310; 30; 20 INJECTION, SOLUTION INTRAVENOUS at 09:23

## 2023-05-15 RX ADMIN — PROPOFOL 150 MG: 10 INJECTION, EMULSION INTRAVENOUS at 09:29

## 2023-05-15 RX ADMIN — SODIUM CHLORIDE, POTASSIUM CHLORIDE, SODIUM LACTATE AND CALCIUM CHLORIDE 9 ML/HR: 600; 310; 30; 20 INJECTION, SOLUTION INTRAVENOUS at 07:31

## 2023-05-15 RX ADMIN — CELECOXIB 200 MG: 200 CAPSULE ORAL at 07:50

## 2023-05-15 RX ADMIN — Medication 120 MG: at 09:29

## 2023-05-15 RX ADMIN — OXYCODONE HYDROCHLORIDE 5 MG: 5 TABLET ORAL at 07:50

## 2023-05-15 RX ADMIN — ONDANSETRON 4 MG: 2 INJECTION INTRAMUSCULAR; INTRAVENOUS at 09:34

## 2023-05-15 NOTE — OP NOTE
Operative Note     Patient Name: Ellie Rajan  : 1960   MRN: [id number]   Date: 05/15/23  Time: 13:54 EDT    Date of Service:  05/15/23  Time of Service:  13:54 EDT    Surgical Staff: Surgeon(s) and Role:     * Maxine Sanches MD - Primary     * Manpreet Eugene MD - Assisting     * Cris Stanton MD - Resident - Assisting   Additional Staff:          Pre-operative diagnosis(es): Pre-Op Diagnosis Codes:     * Vulvar cancer [C51.9]     Post-operative diagnosis(es): Post-Op Diagnosis Codes:     * Vulvar cancer [C51.9]   Procedure(s): Procedure(s):  EXAM UNDER ANESTHESIA, VULVAR BIOPSY     Antibiotics: Ancef 2 g     Anesthesia: Type: Choice  ASA:  III     Objective      Operative findings: 5 x 8 cm exophytic condyloma like tumor arising from superior vulva bilaterally, friable and bleeding.  This was involving the clitoris.  There is agglutination at the vaginal introitus which was manually freed.  After this, the urethra could be visualized and was free from tumor.  The tumor across the midline.   Specimens removed: ID Type Source Tests Collected by Time   A (Not marked as sent) : Vulvar Region Tissue Vulva TISSUE PATHOLOGY EXAM Maxine Sanches MD 5/15/2023 1000      Fluid Intake and Output: I/O this shift:  In: 600 [P.O.:200; I.V.:400]  Out: -    Blood products used: no   Drains: * No LDAs found *   Implant Information: Nothing was implanted during the procedure   Complications: none   Intraoperative consult(s):    Condition: stable   Disposition: to PACU and then discharge home       INDICATIONS: Ellie Rajan is a 62 y.o. year old female who presented with locally advanced vulvar cancer. She was explained the options for management, and the decision was made to proceed with an exam under anesthesia and vulvar biopsy. The expected outcomes and the risks of the procedure were explained to the patient and she signed the operative consent after her questions were answered.     PROCEDURE IN DETAIL:  The patient was met in the patient receiving area on the day of surgery and surgical consent was confirmed. The patient was taken to the operating room where general anesthesia was obtained without difficulty. She was positioned in lithotomy using Liam stirrups ensuring no undue pressure on lateral legs.  A time-out procedure for safety was performed. Examination under anesthesia revealed the findings as described above. The patient was then prepped and draped in the usual sterile fashion. Dr. Eugene was present for the examination under anesthesia with the above findings noted.    The vulva surrounding the lesion was injected with 20 mL of lidocaine. The lesion was then removed using a combination of bovie coagulation and blunt dissection using ring forceps and allis clamps. Using bovie coagulation the base of the tumor was coagulated with good hemostasis. Monsels solution was also applied. The patient tolerated the procedure well and was then transferred to the recovery room in satisfactory condition. Dr. Sanches was present and scrubbed for the entirety of the procedure.    I participated in all key aspects of this patient's surgical care.  The resident acted under my direct supervision for the entirety of the procedure.  After completion of the procedure, Dr. Eugene and Dr. Sanches discussed treatment options of standard excision with bilateral inguinofemoral lymph node dissection versus definitive radiation with the patient's family.  She is to follow-up for discussion of options in 1 to 2 weeks.    Maxine Sanches MD  05/15/23

## 2023-05-15 NOTE — PROGRESS NOTES
RADIATION ONCOLOGY NOTE  5/15/2023    Our service was requested by Dr. Sanches to participate in an exam under anesthesia this morning.  In short, Mrs. Rajan is a 62 year old female with a vulvar mass, with concern for a malignancy.  She has not tolerated exams well in the office, so she is being brought to the operating room this morning for examination and biopsies.  I met Mrs. Rajan in the holding area and discussed my role as a Radiation Oncologist.  We will proceed to the OR as scheduled, and once we have been able to complete the procedure, we will discuss definitive treatment plans with Dr. Sanches and the family.

## 2023-05-15 NOTE — ANESTHESIA PREPROCEDURE EVALUATION
Anesthesia Evaluation     history of anesthetic complications:               Airway   Mallampati: I  TM distance: >3 FB  Neck ROM: full  No difficulty expected  Dental      Pulmonary    (+) a smoker,   Cardiovascular     (+) hypertension,       Neuro/Psych  (+) headaches, psychiatric history Anxiety and Depression,    GI/Hepatic/Renal/Endo    (+) obesity,  GERD,  thyroid problem hypothyroidism    Musculoskeletal     Abdominal    Substance History      OB/GYN          Other      history of cancer                    Anesthesia Plan    ASA 3     general     (History of aspiration with previous lithotomy position years ago.  With previous history and presence of  GERD I would intubate her.)  intravenous induction     Anesthetic plan, risks, benefits, and alternatives have been provided, discussed and informed consent has been obtained with: patient.    Plan discussed with CRNA.        CODE STATUS:

## 2023-05-15 NOTE — ANESTHESIA PROCEDURE NOTES
Airway  Urgency: elective    Date/Time: 5/15/2023 9:30 AM  Airway not difficult    General Information and Staff    Patient location during procedure: OR  CRNA/CAA: Brittany Ayers CRNA    Indications and Patient Condition  Indications for airway management: airway protection    Preoxygenated: yes  MILS not maintained throughout  Mask difficulty assessment: 1 - vent by mask    Final Airway Details  Final airway type: endotracheal airway      Successful airway: ETT  Cuffed: yes   Successful intubation technique: direct laryngoscopy  Facilitating devices/methods: intubating stylet  Endotracheal tube insertion site: oral  Blade: Melvin  Blade size: 3  ETT size (mm): 7.0  Cormack-Lehane Classification: grade I - full view of glottis  Placement verified by: chest auscultation and capnometry   Measured from: lips  ETT/EBT  to lips (cm): 20  Number of attempts at approach: 1  Assessment: lips, teeth, and gum same as pre-op and atraumatic intubation    Additional Comments  Negative epigastric sounds, Breath sound equal bilaterally with symmetric chest rise and fall. RSI, cricoid pressure, teeth intact

## 2023-05-15 NOTE — ANESTHESIA POSTPROCEDURE EVALUATION
Patient: Ellie Rajan    Procedure Summary     Date: 05/15/23 Room / Location:  [facility] /  [facility]    Anesthesia Start: 0923 Anesthesia Stop: 1006    Procedures:       EXAM UNDER ANESTHESIA, POSSIBLE BIOPSY (Vagina)      POSSIBLE VAGINAL BIOPSY (Vagina) Diagnosis:       Vulvar cancer      (Vulvar cancer [C51.9])    Surgeons: Maxine Sanches MD Provider: Doc Ngo MD    Anesthesia Type: general ASA Status: 3          Anesthesia Type: general    Vitals  No vitals data found for the desired time range.          Post Anesthesia Care and Evaluation    Patient location during evaluation: PACU  Patient participation: complete - patient participated  Level of consciousness: awake and alert  Pain management: adequate    Airway patency: patent  Anesthetic complications: No anesthetic complications  PONV Status: none  Cardiovascular status: hemodynamically stable and acceptable  Respiratory status: nonlabored ventilation, acceptable and nasal cannula  Hydration status: acceptable

## 2023-05-15 NOTE — INTERVAL H&P NOTE
"[facility] Pre-op    Full history and physical note from office is attached.    /79 (BP Location: Right arm, Patient Position: Lying)   Pulse 62   Temp 96.9 °F (36.1 °C) (Temporal)   Resp 18   Ht 170.2 cm (67\")   Wt 118 kg (261 lb)   SpO2 97%   BMI 40.88 kg/m²     Immunizations:  Influenza:  2022  Pneumococcal:  UTD  Tetanus:  No  Covid : x2    LAB Results:  Lab Results   Component Value Date    WBC 8.73 05/05/2023    HGB 13.2 05/05/2023    HCT 40.2 05/05/2023    MCV 88.5 05/05/2023     05/05/2023    NEUTROABS 5.20 05/05/2023    GLUCOSE 94 05/05/2023    BUN 16 05/05/2023    CREATININE 0.66 05/05/2023     05/05/2023    K 3.5 05/05/2023     05/05/2023    CO2 28.0 05/05/2023    CALCIUM 9.5 05/05/2023    ALBUMIN 4.4 05/05/2023    AST 23 05/05/2023    ALT 34 (H) 05/05/2023    BILITOT 0.3 05/05/2023       Cancer Staging (if applicable)  Cancer Patient: _x_ yes __no __unknown__N/A; If yes, clinical stage T:__ N:__M:__, stage group Unknown      Impression: Vulvar cancer       Plan: EXAM UNDER ANESTHESIA, POSSIBLE BIOPSY; POSSIBLE VAGINAL BIOPSY      CHANCE Santacruz   5/15/2023   08:06 EDT     I saw and evaluated the patient. I agree with the findings and the plan of care as documented in the note.    Maxine Sanches MD  05/15/23  09:05 EDT    "

## 2023-05-22 ENCOUNTER — HOSPITAL ENCOUNTER (OUTPATIENT)
Dept: PET IMAGING | Facility: HOSPITAL | Age: 63
Discharge: HOME | End: 2023-05-22
Payer: MEDICAID

## 2023-05-22 DIAGNOSIS — C51.8 MALIGNANT NEOPLASM OF OVERLAPPING SITES OF VULVA: ICD-10-CM

## 2023-05-22 PROCEDURE — 78815 PET IMAGE W/CT SKULL-THIGH: CPT

## 2023-05-22 PROCEDURE — A9552 F18 FDG: HCPCS | Performed by: OBSTETRICS & GYNECOLOGY

## 2023-05-22 PROCEDURE — 0 FLUDEOXYGLUCOSE F18 SOLUTION: Performed by: OBSTETRICS & GYNECOLOGY

## 2023-05-22 RX ADMIN — FLUDEOXYGLUCOSE F18 1 DOSE: 300 INJECTION INTRAVENOUS at 13:43

## 2023-05-23 ENCOUNTER — TELEPHONE (OUTPATIENT)
Dept: GYNECOLOGIC ONCOLOGY | Facility: CLINIC | Age: 63
End: 2023-05-23
Payer: MEDICAID

## 2023-05-23 LAB
CYTO UR: NORMAL
DX PRELIMINARY: NORMAL
LAB AP CASE REPORT: NORMAL
LAB AP CLINICAL INFORMATION: NORMAL
LAB AP DIAGNOSIS COMMENT: NORMAL
PATH REPORT.FINAL DX SPEC: NORMAL
PATH REPORT.GROSS SPEC: NORMAL

## 2023-05-23 NOTE — TELEPHONE ENCOUNTER
I called pt to notify her of pathology report.  She has follow up Friday, 5/26.  She noted her farther is quite ill.  She declined moving appt to tomorrow.  I notified her of PET/CT also.  Need to get treatment plan and evaluate wound.  She V/U.  Electronically signed by Maxine Sanches MD, 05/23/23, 1:28 PM EDT.

## 2023-05-24 ENCOUNTER — TELEPHONE (OUTPATIENT)
Dept: GYNECOLOGIC ONCOLOGY | Facility: CLINIC | Age: 63
End: 2023-05-24
Payer: MEDICAID

## 2023-05-24 NOTE — TELEPHONE ENCOUNTER
Caller: BENJAMIN REILLY    Relationship to patient: Emergency Contact    Best call back number: [phone]    Chief complaint: DEATH IN THE FAMILY    Type of visit: POST OP    Requested date: CALL TO R/S     If rescheduling, when is the original appointment: 5/26/2023

## 2023-06-06 ENCOUNTER — TELEPHONE (OUTPATIENT)
Dept: GYNECOLOGIC ONCOLOGY | Facility: CLINIC | Age: 63
End: 2023-06-06
Payer: MEDICAID

## 2023-06-06 NOTE — TELEPHONE ENCOUNTER
Received Pts attending physician's statement request from MA box today 06/06/2023    Will fill out and fax to number on paperwork

## 2023-06-06 NOTE — PROGRESS NOTES
Ellie Rajan  [id number]  1960      Reason for Visit:  Treatment planning for newly diagnosed vulvar SCC, Postoperative evaluation    History of Present Illness:  Patient is a very pleasant 62 y.o. woman who presents for a post operative evaluation status post vulvar biopsy performed on 5/15/23.      Surgery and hospital course were uncomplicated.  Today, patient notes normal bowel and bladder function.  Her pain is greatly improved when compared to preoperatively.  She presents for discussion of treatment options.  Patient did reschedule her appointment as her father  last week.    Past Medical History, Past Surgical History, Social History, Family History have been reviewed and are without significant changes except as mentioned.    Review of Systems   All other systems were reviewed and are negative except as mentioned above.    Medications:  The current medication list was reviewed in the EMR    ALLERGIES:    Allergies   Allergen Reactions    Sulfa Antibiotics Hives         /62   Pulse 62   Temp 96.9 °F (36.1 °C)   Wt 120 kg (264 lb 14.4 oz)   SpO2 98%   BMI 41.49 kg/m²         Physical Exam  Constitutional:  Patient is a pleasant woman in no acute distress.  Extremities:  Bilateral lower extremities are non-tender.  Gynecologic: Area of excisional biopsy continues to be ulcerated at the bilateral anterior labia.  Pain greatly improved on examination.  Exam still somewhat limited due to discomfort.    PATHOLOGY:  Final Diagnosis   VULVA BIOPSY:  Invasive squamous cell carcinoma, estimated depth of invasion 4 mm, with total tumor size greater than 2 cm (see comment).   Electronically signed by Dillan Sands MD on 2023 at 1144   Preliminary Diagnosis    VULVA, BIOPSY:  Pending external consultation (see comment).   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  This case was referred to Dr. Yobany Recinos at [facility] in consultation.   The consultant feels that there are extensive areas of invasion, including areas of bulbous invasion.  He feels the exact depth of invasion is difficult to establish, but estimated depth of at least 4 mm stating it could be more.  No lymphovascular invasion is identified.  Please see consult report for additional discussion.       Total invasive tumor size is felt to be greater than 2 cm based upon the size of the gross specimen.  Immunohistochemical staining shows the tumor to be strongly positive for p16 in the majority of areas.  The proliferation marker Ki-67 is markedly elevated being positive in approximately 33% of cells, and marked cells throughout the full-thickness.   NM PET/CT Skull Base to Mid Thigh    Result Date: 5/23/2023    1. Focal, somewhat linear area of increased uptake in the region of the vulva, likely consistent with patient's given history of vulvar malignancy. 2. No evidence of metastatic disease is identified.     PATRICK NICOLE MD       Electronically Signed and Approved By: PATRICK NICOLE MD on 5/23/2023 at 10:26            I personally reviewed the PET/CT imaging and agree that there is no findings concerning for metastatic disease.    ASSESSMENT/PLAN:  Ellie Rajan returns for a post-operative evaluation today.  All pathology reports were discussed with the patient.      Patient requested refill of her Tylenol and ibuprofen today.  She thinks it helps quite a bit with her arthritis.      We discussed options of radiation versus surgical excision.  Initially, I was concerned that the tumor was involving the urethral meatus and was not amenable to primary surgical resection.  However, at the time of surgery I was able to visualize the urethral meatus and I think there is an appropriate margin that can be obtained.  I discussed that this with patient and family today.  I think she would actually be a good surgical candidate now that I can visualize the area of concern better.  We discussed  pros and cons of primary radiation versus resection.  We discussed risk of damage to vessels, nerves and lymphedema is a long-term consequences related to surgery.  We discussed that some of these issues can also occur with radiation treatment.  We discussed need for JAE drain placement at bilateral lymph node dissection areas and 24-hour hospital stay to discussed wound care and JAE care prior to discharge home.  We discussed follow-up postoperatively and likelihood of wound care due to incomplete closure of wounds or incisional separation..  After counseling, patient was agreeable to proceed with surgery.  Despite the negative PET scan, inguinofemoral lymph node dissection is indicated as any metastatic disease could impact adjuvant treatment and recurrence outside of the primary tumor is very challenging from a curative standpoint.    Patient was consented for modified radical vulvectomy, bilateral inguinofemoral lymph node dissection.      Risks and benefits of surgery were discussed.  This included, but was not limited to, infection and bleeding like when the skin is cut; damage to surrounding structures; and incisional complications.  Risk of DVT was addressed for major surgeries.  Standard of care efforts to minimize these risks were reviewed.  Typical hospital stay and recovery were discussed as well as post-procedure precautions.  Surgical implications of chronic illnesses on recovery and surgical outcome were reviewed.   Pain medication regimen for postoperative care was discussed.  Typical regimen and avoidance of narcotics was discussed.  Patient was educated that other factors, such as existing narcotic use, can impact postoperative pain management.   Patient verbalized understanding of the plan including the risks and benefits.  Appropriate perioperative testing including laboratory evaluation, EKG as clinically indicated, chest x-ray as clinically indicated, and preadmission evaluation were all ordered  as a part of this patient's care.    She is to follow-up for surgery in the near future.    I spent 35 minutes caring for Ellie on this date of service. This time includes time spent by me in the following activities: preparing for the visit, reviewing tests, performing a medically appropriate examination and/or evaluation, counseling and educating the patient/family/caregiver, ordering medications, tests, or procedures, referring and communicating with other health care professionals, documenting information in the medical record, and independently interpreting results and communicating that information with the patient/family/caregiver    Patient was seen and examined with Dr. Serrano,  resident, who performed portions of the examination and documentation for this patient's care under my direct supervision.  I agree with the above documentation and plan.    Maxine Sanches MD  06/07/23  12:10 EDT

## 2023-06-06 NOTE — H&P (VIEW-ONLY)
Ellie Rajan  [id number]  1960      Reason for Visit:  Treatment planning for newly diagnosed vulvar SCC, Postoperative evaluation    History of Present Illness:  Patient is a very pleasant 62 y.o. woman who presents for a post operative evaluation status post vulvar biopsy performed on 5/15/23.      Surgery and hospital course were uncomplicated.  Today, patient notes normal bowel and bladder function.  Her pain is greatly improved when compared to preoperatively.  She presents for discussion of treatment options.  Patient did reschedule her appointment as her father  last week.    Past Medical History, Past Surgical History, Social History, Family History have been reviewed and are without significant changes except as mentioned.    Review of Systems   All other systems were reviewed and are negative except as mentioned above.    Medications:  The current medication list was reviewed in the EMR    ALLERGIES:    Allergies   Allergen Reactions    Sulfa Antibiotics Hives         /62   Pulse 62   Temp 96.9 °F (36.1 °C)   Wt 120 kg (264 lb 14.4 oz)   SpO2 98%   BMI 41.49 kg/m²         Physical Exam  Constitutional:  Patient is a pleasant woman in no acute distress.  Extremities:  Bilateral lower extremities are non-tender.  Gynecologic: Area of excisional biopsy continues to be ulcerated at the bilateral anterior labia.  Pain greatly improved on examination.  Exam still somewhat limited due to discomfort.    PATHOLOGY:  Final Diagnosis   VULVA BIOPSY:  Invasive squamous cell carcinoma, estimated depth of invasion 4 mm, with total tumor size greater than 2 cm (see comment).   Electronically signed by Dillan Sands MD on 2023 at 1144   Preliminary Diagnosis    VULVA, BIOPSY:  Pending external consultation (see comment).   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  This case was referred to Dr. Yobany Recinos at [facility] in consultation.   The consultant feels that there are extensive areas of invasion, including areas of bulbous invasion.  He feels the exact depth of invasion is difficult to establish, but estimated depth of at least 4 mm stating it could be more.  No lymphovascular invasion is identified.  Please see consult report for additional discussion.       Total invasive tumor size is felt to be greater than 2 cm based upon the size of the gross specimen.  Immunohistochemical staining shows the tumor to be strongly positive for p16 in the majority of areas.  The proliferation marker Ki-67 is markedly elevated being positive in approximately 33% of cells, and marked cells throughout the full-thickness.   NM PET/CT Skull Base to Mid Thigh    Result Date: 5/23/2023    1. Focal, somewhat linear area of increased uptake in the region of the vulva, likely consistent with patient's given history of vulvar malignancy. 2. No evidence of metastatic disease is identified.     PATRICK NICOLE MD       Electronically Signed and Approved By: PATRICK NICOLE MD on 5/23/2023 at 10:26            I personally reviewed the PET/CT imaging and agree that there is no findings concerning for metastatic disease.    ASSESSMENT/PLAN:  Ellie Rajan returns for a post-operative evaluation today.  All pathology reports were discussed with the patient.      Patient requested refill of her Tylenol and ibuprofen today.  She thinks it helps quite a bit with her arthritis.      We discussed options of radiation versus surgical excision.  Initially, I was concerned that the tumor was involving the urethral meatus and was not amenable to primary surgical resection.  However, at the time of surgery I was able to visualize the urethral meatus and I think there is an appropriate margin that can be obtained.  I discussed that this with patient and family today.  I think she would actually be a good surgical candidate now that I can visualize the area of concern better.  We discussed  pros and cons of primary radiation versus resection.  We discussed risk of damage to vessels, nerves and lymphedema is a long-term consequences related to surgery.  We discussed that some of these issues can also occur with radiation treatment.  We discussed need for JAE drain placement at bilateral lymph node dissection areas and 24-hour hospital stay to discussed wound care and JAE care prior to discharge home.  We discussed follow-up postoperatively and likelihood of wound care due to incomplete closure of wounds or incisional separation..  After counseling, patient was agreeable to proceed with surgery.  Despite the negative PET scan, inguinofemoral lymph node dissection is indicated as any metastatic disease could impact adjuvant treatment and recurrence outside of the primary tumor is very challenging from a curative standpoint.    Patient was consented for modified radical vulvectomy, bilateral inguinofemoral lymph node dissection.      Risks and benefits of surgery were discussed.  This included, but was not limited to, infection and bleeding like when the skin is cut; damage to surrounding structures; and incisional complications.  Risk of DVT was addressed for major surgeries.  Standard of care efforts to minimize these risks were reviewed.  Typical hospital stay and recovery were discussed as well as post-procedure precautions.  Surgical implications of chronic illnesses on recovery and surgical outcome were reviewed.   Pain medication regimen for postoperative care was discussed.  Typical regimen and avoidance of narcotics was discussed.  Patient was educated that other factors, such as existing narcotic use, can impact postoperative pain management.   Patient verbalized understanding of the plan including the risks and benefits.  Appropriate perioperative testing including laboratory evaluation, EKG as clinically indicated, chest x-ray as clinically indicated, and preadmission evaluation were all ordered  as a part of this patient's care.    She is to follow-up for surgery in the near future.    I spent 35 minutes caring for Ellie on this date of service. This time includes time spent by me in the following activities: preparing for the visit, reviewing tests, performing a medically appropriate examination and/or evaluation, counseling and educating the patient/family/caregiver, ordering medications, tests, or procedures, referring and communicating with other health care professionals, documenting information in the medical record, and independently interpreting results and communicating that information with the patient/family/caregiver    Patient was seen and examined with Dr. Serrano,  resident, who performed portions of the examination and documentation for this patient's care under my direct supervision.  I agree with the above documentation and plan.    Maxine Sanches MD  06/07/23  12:10 EDT

## 2023-06-07 ENCOUNTER — OFFICE VISIT (OUTPATIENT)
Dept: GYNECOLOGIC ONCOLOGY | Facility: CLINIC | Age: 63
End: 2023-06-07
Payer: MEDICAID

## 2023-06-07 VITALS
HEART RATE: 62 BPM | DIASTOLIC BLOOD PRESSURE: 62 MMHG | OXYGEN SATURATION: 98 % | WEIGHT: 264.9 LBS | SYSTOLIC BLOOD PRESSURE: 138 MMHG | TEMPERATURE: 96.9 F | BODY MASS INDEX: 41.49 KG/M2

## 2023-06-07 DIAGNOSIS — Z01.818 PRE-OP EVALUATION: ICD-10-CM

## 2023-06-07 DIAGNOSIS — C51.9 VULVAR CANCER: Primary | ICD-10-CM

## 2023-06-07 RX ORDER — CELECOXIB 100 MG/1
200 CAPSULE ORAL ONCE
OUTPATIENT
Start: 2023-06-07 | End: 2023-06-07

## 2023-06-07 RX ORDER — PREGABALIN 75 MG/1
150 CAPSULE ORAL ONCE
OUTPATIENT
Start: 2023-06-07 | End: 2023-06-07

## 2023-06-07 RX ORDER — ACETAMINOPHEN 500 MG
500 TABLET ORAL EVERY 6 HOURS PRN
Qty: 30 TABLET | Refills: 2 | Status: SHIPPED | OUTPATIENT
Start: 2023-06-07

## 2023-06-07 RX ORDER — HEPARIN SODIUM 5000 [USP'U]/ML
5000 INJECTION, SOLUTION INTRAVENOUS; SUBCUTANEOUS ONCE
OUTPATIENT
Start: 2023-06-07 | End: 2023-06-07

## 2023-06-07 RX ORDER — IBUPROFEN 600 MG/1
600 TABLET ORAL EVERY 6 HOURS PRN
Qty: 30 TABLET | Refills: 1 | Status: SHIPPED | OUTPATIENT
Start: 2023-06-07

## 2023-06-07 RX ORDER — OXYCODONE HYDROCHLORIDE 5 MG/1
5 TABLET ORAL ONCE
OUTPATIENT
Start: 2023-06-07 | End: 2023-06-07

## 2023-06-07 RX ORDER — ACETAMINOPHEN 500 MG
1000 TABLET ORAL ONCE
OUTPATIENT
Start: 2023-06-07 | End: 2023-06-07

## 2023-06-07 RX ORDER — VALSARTAN 320 MG/1
1 TABLET ORAL DAILY
COMMUNITY
Start: 2023-05-26

## 2023-06-07 RX ORDER — AMLODIPINE BESYLATE 5 MG/1
1 TABLET ORAL DAILY
COMMUNITY
Start: 2023-05-14

## 2023-06-07 NOTE — PATIENT INSTRUCTIONS
Surgery Instructions            Ellie Rajan  [id number]  1960      SURGEON:  Maxine Sanches MD    Surgery Coordinator: Laura HERR    Gynecological Oncology  [address] [facility] [address]   [facility], [address]  Phone: [phone]                   Fax: [phone]      PAT Appointment            You will need to get your blood drawn locally between 6/15/23-6/19/23 and have them fax the results to us.    Surgery Appointment      Your surgery has been scheduled on 6/22/2023.  You will need to go to Main Registration to check in at 1100. Then you will be sent to the [address] [place] [address] floor surgery registration desk to check in.    Nothing by mouth after midnight on 6/21/2023.    If you are feeling sick, have a fever or cough and have seen your PCP let our office know 48 hours prior to surgery. It may be subject to rescheduling.       The Day of Surgery:    Do not chew gum or tobacco, smoke, or eat mints or hard candy. Shower and wash your hair. You may brush your teeth but do not swallow water. Use any wipes that Pre-admission testing has given you.     Please arrive for surgery as instructed by the pre-op nurse, often one to two hours before your surgery.  Once you are called to go to your pre-op room, no one will be allowed in the pre op room.   Please note no one under age 12 is permitted to stay in the waiting area without supervision.  Remove all jewelry, including rings and piercings. Do not bring valuables to the hospital.  Wear loose-fitting clothing.  Avoid wearing eye makeup or contact lenses  We make every effort to begin surgery at your scheduled start time but delays do occur. We will keep you and your family updated about any delays  Please note: you MUST have a  over the age of 18 to drive you home from the hospital. You may not use Uber, Lyft or a taxi.    Please remember to bring:    Photo ID and current medical insurance card  Advanced directives, living  will or power of  (if applicable)  Current list of all medications, including over-the- counter and herbal supplements  List of allergies  CPAP device if you have sleep apnea  Any assistive devices or equipment needed after surgery    While You are In the Pre-Op Room:  The nurse will review your health history and will place an IV (into the vein) in your hand or arm for fluids and medicines.  An anesthesia provider will talk with you about anesthesia and pain control during and after surgery.  A member of the surgical team can answer your questions.    Directions to [address] ? [place], [place] [address] ? [phone]    From [address] and [address] [facility]:  Take [address] to the [employer] exit. Go right on [address] to [address]. Right on [address] to [facility].   Left on [facility] to [facility] which is on the left.    From [address] [facility]:  Get off [address] at the [employer] exit. Go left on [address] to [address]. Right on [address] to [facility]. Left on   [facility] to [facility] which is on the left.     From the South ([place]):  Follow  27 to approximately one mile inside [facility]. [facility] is on the right at [facility] and   [facility].     Parking:  Free  Parking - Take Entrance 2 off of [facility] and go straight ahead to [address] [place] [address].  Self Parking - Take Entrance 1 off of [facility], bear left and follow the road to [facility].    Directions to Registration:  If entering through front of [address] [place] [address] ( parking), take a right and proceed up the hallway connecting [address] [place] [address] to   [address] [place] [address]. Registration is on the left about group home up the lyon.    If entering from [facility], take garage elevator to first floor (1), exit to the right and proceed through the doors to outside, follow the covered sidewalk  "to entrance of [facility], follow signs to [address] [place] [address], this leads to the [facility] lobby and information desk. Proceed past the information desk to the hallway that connects [address] [place] [address] to the [facility]. Registration is on the left about nursing home up the lyon.    Directions to Pre-admission Testing:  Follow directions to Registration and Pre-admission Testing is next door to Registration             PREPARING FOR SURGERY  **Disability or Work Release Forms     Work: The amount of time you will be off work after surgery depends on both your surgery and your job. Discuss this with your doctor before surgery. If you have any questions about this, call your doctor.  You must provide all forms completed and signed to the GYN ONCOLOGY office.    FORM FOR AUHTHORIZATION FOR USE AND/OR DISCLOSURE OF PROCTED HEALTH INFORMATION CAN BE PROVIDED UPON REQUEST.    Preoperative Evaluation and Optimization  If your doctor tells you to get a preoperative evaluation from your primary care provider, cardiologist, or other specialist, it is your responsibility to make sure to complete these well before your surgery. We want you to get evaluated to make sure you are as healthy as possible when you have your surgery. If the evaluation, including all recommended testing, is not done in time, your surgery will be postponed.    If you take diabetic medications please consult with the prescriber.  Continue antidepressants, Beta Blockers \"olol\", anti-seizure medication, GERD medication (heartburn), Opioids and Parkinson's medication.  Let us know if you have a history of blood clots or are taking a blood thinner before your surgery, this will need to be held and you will need to discuss this with staff.   You are allowed 1 visitor that may remain in the waiting room at both locations.  Visitors cannot come back to pre-op or post-op areas.    Please note: you MUST have a  over the age of 18 to drive you home from the hospital. You may " not use Uber, Lyft or a taxi.    Physical Fitness  Research shows that getting more physical activity before surgery can lower your risk for problems after surgery. Walking is a great way to improve your fitness level before surgery. Even if you start walking just a few weeks before surgery, it can make a big difference.     Quit Smoking  If you smoke, your risk of having a lung problem is at least twice that of a non-smoker.    Surgical incisions will not heal as well and you have a higher risk of infection  The heart has to work harder.  It is best to quit smoking 6 to 8 weeks before surgery. This gives your lungs more time to recover.    Outpatient Surgery  You will need to have someone bring you to the hospital, stay in the waiting room during your procedure and take you home at discharge. It is recommended that someone stay with you 24 hours after your procedure.     If you live more than a 4-hour drive away from the hospital, or live in an area without easy access to an emergency department, we recommend you plan to spend another night or two close to the hospital before you go home. For assistance with hotel, prices and vouchers let our office know and we can let you talk with our Oncology Social worker, Althea Pires.     Post-Operative Visit  You will be scheduled a post-operative appointment for 3 weeks after your surgical procedure. If you do not have an appointment please call the office and have that scheduled.     How to prevent nausea  The best way to prevent nausea is to eat frequent small meals. It is especially important to eat something before taking pain medication. Take your ondansetron if you are feeling nauseous do not wait.    Pain Management after Surgery    If you have kidney disease or liver disease and are not to take ibuprofen or Tylenol please let your doctor or nurse know.     Driving: Do not drive while you are taking prescription pain medications.     It is normal to have some pain  after surgery. The goal of managing your acute pain after surgery is to minimize your pain so you feel comfortable enough to get up, take deep breaths, wash, get dressed, and do simple tasks in your home. Some discomfort is likely. We do not expect you to be completely free of pain.   Pain is usually worst the first 24-48 hours after surgery.    What can I do to relieve pain without medications?   Apply heat with a warm compress, hot water bottle, or heating pad. Do not put anything hot directly on your skin or lie on top of it.   Apply cooling with a cold gel pack, bag of peas, or crushed ice. Wrap in a soft cloth or towel.   Do not push or press on your incision. It is normal for your incision to be sore for up to 6 weeks if you push on it.   Unless your doctor gives you a different plan, ibuprofen and acetaminophen are the main medicines you will use to manage your pain.   You may also get a prescription for an opioid such as oxycodone or hydrocodone. Opioids should only be added as needed to reduce pain that is not adequately relieved by ibuprofen and acetaminophen.                                                                                         Typical Pain Medication Schedule  6 am Ibuprofen 600 mg   9 am acetaminophen 650 mg   12:00 pm Noon Ibuprofen 600 mg   3:00 pm Acetaminophen 650 mg   6:00 pm Ibuprofen 600 mg   9:00 pm Acetaminophen 650 mg   12:00 am Midnight  Ibuprofen 600 mg.      What if this schedule does not control my pain?   Please call the office and let us know at [phone]  Reduce the number and frequency of opioids as soon as you can. Do not take more opioid medication than your doctor has prescribed.   Common side effects and risks of opioids include drowsiness, mental confusion, dizziness, nausea, constipation, itching, dry mouth, and slowed breathing.   Never mix opioids with alcohol, sleep aids or anti-anxiety medications. These are dangerous combinations that increase the  harmful effects of opioid pain medication. Many overdose deaths from opioids also involve at least one other drug or alcohol.   It is illegal to sell or share an opioid without a prescription properly issued by a licensed health care prescriber.    What is the best way to stop taking pain medications?  1. Stop opioid use.  2. Stop acetaminophen.  3. Gradually decrease how often you take ibuprofen. It is a good idea to take a 600 mg pill before you start a more tiring activity such as going shopping or for a long walk.  Once you get more active, you may have a day when your pain gets a little worse. If this happens, take ibuprofen. If ibuprofen does not relieve the pain, add acetaminophen.    What do I need to know about bowel movements?   Starting as soon as you get home, take 17 grams of Miralax (one capful) twice a day to keep your stool soft and prevent constipation. It is important to prevent constipation because straining can damage your stitches. Your stool should be as soft as toothpaste. If your stool gets too loose, cut back to using Miralax only once a day.   If you used a bowel prep before surgery, it is common not to have a bowel movement on the first and second day after surgery.   If you have not had a bowel movement by 7 p.m. on the third day after surgery, do one of the following at bedtime:  Drink 1 ounce (2 tablespoons) of Milk of Magnesia (MOM). If you have used MOM before and know you need to take 2 ounces for it to work for you, it is OK to do this, or Take 2 Senekot tablets.   Go for short walks. Walking and being active will help you have a bowel movement.   If you have not had a bowel movement by noon on the fourth day after surgery, call the clinic where you were seen and ask to speak with a nurse.    What kind of vaginal bleeding is normal?  Spotting of pink or red blood from the vagina is normal. Brown-colored discharge that gradually changes to a light yellow or cream color is also normal  and can last up to 6 weeks. The brownish discharge is old blood and often has a strong odor, this is okay. Call us if it becomes heavier or foul smelling or you are saturating a maxi pad within an hour.     At Home after Surgery: If you experience a medical emergency call 911 or have someone drive you to your nearest emergency department.     When should I call my doctor?  Call your doctor right away, any time of the day or night, including on weekends and holidays, if you have any of the following signs or symptoms:   A temperature over 100.4°F (38°C) If you don't have one, please buy a thermometer before your surgery.   Heavy bleeding (soaking a regular pad in an hour or less)   Severe pain in your abdomen or pelvis that the pain medication is not helping   Chest pain or difficulty breathing   Swelling, redness, or pain in your legs   An incision that opens   An incision that is red or hot   Fluid or blood leaking from an incision   New bruising after leaving the hospital that is large or spreading. A little bit of bruising around an incision is normal.   Nausea and vomiting    Skin rash   Unable to urinate at all   Pain or stinging when you pass urine   Blood or cloudiness in your urine   Non-stop urge to pass urine, but only dribbling when you try to go   A sense that something is wrong.    Caring for post-surgical incisions     Do not have vaginal intercourse until your doctor evaluates you at a postop visit and tells you OK.     Showers: You may shower starting 24 hours after your surgery.    NO BATHS: do not take a tub bath up to 6 weeks after surgery.   Do not put any lotion, oil, gel, or powder on or near your incisions.     For incisions inside your vagina: Incisions inside the vagina are closed with dissolvable stitches. When they dissolve you may see little bits of suture material that look like thin pieces of string on your underwear or on toilet tissue after wiping. This is normal. Do not put anything  inside the vagina until your doctor evaluates you at a postop visit and tells you when it will be OK.      For incisions on your skin: If there is a dressing over the incision, remove it before your first shower. Leave the slim adhesive strips that are under the dressing in place. During the week after surgery, they will usually curl up at the edges and then come off on their own. If they are still there a week after surgery, gently remove them.  To clean the incisions, first wash your hands, and then get your hands sudsy with soap and gently wash or let the sudsy water run down over the incisions. Dry the incisions well after washing by gently patting with a towel. You may use a blow dryer, but it must be on a low-heat setting.    When will my bladder function get back to normal?   You received extra fluid through your I.V. while you were in the hospital, so it is normal to urinate (pee) more than usual when you first get home.   It is normal for your bladder function to be different after surgery. You may notice a pause before your urine stream starts or that your urine stream is slower. This will gradually get better, but it may take up to 6 months before you are back to normal. Be patient, relax, and sit on the toilet a little longer.   Drinking more water than usual will not help the bladder recover faster.    What is a normal energy level?  It is normal to have a decreased energy level after surgery. Listen to your body. If you need to rest, do it. Give yourself permission to take it easy. Once you settle into a normal routine at home, you will find that you slowly begin to feel better. Walking around the house and taking short walks outside will help you get back to normal.    What kind of exercise/activities can I do?   Exercise is important for a healthy recovery. We encourage you to begin normal physical activity, like walking, within hours of surgery. Start with short walks and gradually increase the  distance and length of time that you walk.   Allow your body time to heal. Do not restart a difficult exercise routine until you have had your post-op exam and your doctor says it is OK.   Lifting: Unless you are given other instructions, for 6 weeks after your surgery do not lift anything over 15 pounds.   Travel: It is best if you do not go far away from home before your postop visit with your doctor. If you have travel plans, talk to your doctor about this before your surgery.      Financial Assistance:    If you have any questions or need assistance, contact your [facility] financial counseling office from 8:30 a.m.-4:30  p.m. Monday through Friday. Closed weekends.   [place]: [phone] or, or visit at [address] [facility], near the entrance.  Financial Assistance Application available upon request      Patient Payments and Correspondence  Customer service representatives are available to assist you from 8:00 a.m. to 6:00 p.m. Eastern Standard Time by calling [phone] Monday through Friday. You can also contact us through [employer].    [facility]  [id number]  [place] [address] [phone]

## 2023-06-08 NOTE — TELEPHONE ENCOUNTER
Faxed Pts paperwork today 06/08/23 @ 2:43pm to number provided on documents.    Received a successful fax confirmation sheet @ 2:45pm    Placed in to be scanned folder to be scanned in to Pts chart

## 2023-06-12 ENCOUNTER — TELEPHONE (OUTPATIENT)
Dept: GYNECOLOGIC ONCOLOGY | Facility: CLINIC | Age: 63
End: 2023-06-12
Payer: MEDICAID

## 2023-06-12 NOTE — TELEPHONE ENCOUNTER
Received Pts paperwork requesting medical records from [place] box    Will print out and fax to number provided

## 2023-06-13 NOTE — TELEPHONE ENCOUNTER
Faxed medical records 06/12/23 @ 4:10pm    Received a successful fax confirmation sheet @ 4:27pm     Placed in to be scanned foled to be scanned into Pts chart

## 2023-06-21 RX ORDER — FAMOTIDINE 10 MG/ML
20 INJECTION, SOLUTION INTRAVENOUS ONCE
Status: CANCELLED | OUTPATIENT
Start: 2023-06-21 | End: 2023-06-21

## 2023-06-21 RX ORDER — SODIUM CHLORIDE 9 MG/ML
40 INJECTION, SOLUTION INTRAVENOUS AS NEEDED
Status: CANCELLED | OUTPATIENT
Start: 2023-06-21

## 2023-06-22 ENCOUNTER — APPOINTMENT (OUTPATIENT)
Dept: GENERAL RADIOLOGY | Facility: HOSPITAL | Age: 63
DRG: 734 | End: 2023-06-22
Payer: MEDICAID

## 2023-06-22 ENCOUNTER — HOSPITAL ENCOUNTER (OUTPATIENT)
Facility: HOSPITAL | Age: 63
Discharge: HOME | DRG: 734 | End: 2023-06-23
Attending: OBSTETRICS & GYNECOLOGY | Admitting: OBSTETRICS & GYNECOLOGY
Payer: MEDICAID

## 2023-06-22 DIAGNOSIS — C51.8 MALIGNANT NEOPLASM OF OVERLAPPING SITES OF VULVA: Primary | ICD-10-CM

## 2023-06-22 DIAGNOSIS — C51.9 VULVAR CANCER: ICD-10-CM

## 2023-06-22 LAB
ABO GROUP BLD: NORMAL
ABO GROUP BLD: NORMAL
BLD GP AB SCN SERPL QL: NEGATIVE
POTASSIUM SERPL-SCNC: 3.6 MMOL/L (ref 3.5–5.2)
RH BLD: POSITIVE
RH BLD: POSITIVE
T&S EXPIRATION DATE: NORMAL

## 2023-06-22 PROCEDURE — 86900 BLOOD TYPING SEROLOGIC ABO: CPT | Performed by: OBSTETRICS & GYNECOLOGY

## 2023-06-22 PROCEDURE — 86900 BLOOD TYPING SEROLOGIC ABO: CPT

## 2023-06-22 PROCEDURE — 88305 TISSUE EXAM BY PATHOLOGIST: CPT | Performed by: OBSTETRICS & GYNECOLOGY

## 2023-06-22 PROCEDURE — 86901 BLOOD TYPING SEROLOGIC RH(D): CPT

## 2023-06-22 PROCEDURE — 88309 TISSUE EXAM BY PATHOLOGIST: CPT | Performed by: OBSTETRICS & GYNECOLOGY

## 2023-06-22 PROCEDURE — 25010000002 HEPARIN (PORCINE) PER 1000 UNITS: Performed by: OBSTETRICS & GYNECOLOGY

## 2023-06-22 PROCEDURE — 25010000002 HYDROMORPHONE 1 MG/ML SOLUTION

## 2023-06-22 PROCEDURE — 56632 VLVCTMY RAD PRTL BI LYMPHAD: CPT | Performed by: OBSTETRICS & GYNECOLOGY

## 2023-06-22 PROCEDURE — 56632 VLVCTMY RAD PRTL BI LYMPHAD: CPT | Performed by: PHYSICIAN ASSISTANT

## 2023-06-22 PROCEDURE — 86850 RBC ANTIBODY SCREEN: CPT | Performed by: OBSTETRICS & GYNECOLOGY

## 2023-06-22 PROCEDURE — 86901 BLOOD TYPING SEROLOGIC RH(D): CPT | Performed by: OBSTETRICS & GYNECOLOGY

## 2023-06-22 PROCEDURE — 25010000002 FENTANYL CITRATE (PF) 50 MCG/ML SOLUTION

## 2023-06-22 PROCEDURE — 84132 ASSAY OF SERUM POTASSIUM: CPT | Performed by: ANESTHESIOLOGY

## 2023-06-22 DEVICE — LIGACLIP MCA MULTIPLE CLIP APPLIERS, 20 LARGE CLIPS
Type: IMPLANTABLE DEVICE | Site: GROIN | Status: FUNCTIONAL
Brand: LIGACLIP

## 2023-06-22 DEVICE — LIGACLIP MCA MULTIPLE CLIP APPLIERS, 20 MEDIUM CLIPS
Type: IMPLANTABLE DEVICE | Site: GROIN | Status: FUNCTIONAL
Brand: LIGACLIP

## 2023-06-22 RX ORDER — LIDOCAINE HYDROCHLORIDE 10 MG/ML
0.5 INJECTION, SOLUTION EPIDURAL; INFILTRATION; INTRACAUDAL; PERINEURAL ONCE AS NEEDED
Status: COMPLETED | OUTPATIENT
Start: 2023-06-22 | End: 2023-06-22

## 2023-06-22 RX ORDER — POLYETHYLENE GLYCOL 3350 17 G/17G
17 POWDER, FOR SOLUTION ORAL DAILY
Status: DISCONTINUED | OUTPATIENT
Start: 2023-06-23 | End: 2023-06-23 | Stop reason: HOSPADM

## 2023-06-22 RX ORDER — NALOXONE HCL 0.4 MG/ML
0.1 VIAL (ML) INJECTION
Status: DISCONTINUED | OUTPATIENT
Start: 2023-06-22 | End: 2023-06-23 | Stop reason: HOSPADM

## 2023-06-22 RX ORDER — OXYCODONE HYDROCHLORIDE 10 MG/1
10 TABLET ORAL EVERY 4 HOURS PRN
Status: DISCONTINUED | OUTPATIENT
Start: 2023-06-22 | End: 2023-06-23 | Stop reason: HOSPADM

## 2023-06-22 RX ORDER — HYDROCHLOROTHIAZIDE 12.5 MG/1
12.5 TABLET ORAL DAILY
Status: DISCONTINUED | OUTPATIENT
Start: 2023-06-23 | End: 2023-06-23 | Stop reason: HOSPADM

## 2023-06-22 RX ORDER — ACETAMINOPHEN 325 MG/1
650 TABLET ORAL EVERY 6 HOURS SCHEDULED
Status: DISCONTINUED | OUTPATIENT
Start: 2023-06-22 | End: 2023-06-23 | Stop reason: HOSPADM

## 2023-06-22 RX ORDER — ACETAMINOPHEN 500 MG
1000 TABLET ORAL ONCE
Status: COMPLETED | OUTPATIENT
Start: 2023-06-22 | End: 2023-06-22

## 2023-06-22 RX ORDER — BUPIVACAINE HYDROCHLORIDE AND EPINEPHRINE 2.5; 5 MG/ML; UG/ML
INJECTION, SOLUTION INFILTRATION; PERINEURAL AS NEEDED
Status: DISCONTINUED | OUTPATIENT
Start: 2023-06-22 | End: 2023-06-22 | Stop reason: HOSPADM

## 2023-06-22 RX ORDER — HEPARIN SODIUM 5000 [USP'U]/ML
5000 INJECTION, SOLUTION INTRAVENOUS; SUBCUTANEOUS EVERY 8 HOURS SCHEDULED
Status: DISCONTINUED | OUTPATIENT
Start: 2023-06-23 | End: 2023-06-23 | Stop reason: HOSPADM

## 2023-06-22 RX ORDER — PREGABALIN 150 MG/1
150 CAPSULE ORAL ONCE
Status: COMPLETED | OUTPATIENT
Start: 2023-06-22 | End: 2023-06-22

## 2023-06-22 RX ORDER — TEMAZEPAM 15 MG/1
15 CAPSULE ORAL NIGHTLY PRN
Status: DISCONTINUED | OUTPATIENT
Start: 2023-06-22 | End: 2023-06-23 | Stop reason: HOSPADM

## 2023-06-22 RX ORDER — VALSARTAN 160 MG/1
320 TABLET ORAL DAILY
Status: DISCONTINUED | OUTPATIENT
Start: 2023-06-23 | End: 2023-06-23 | Stop reason: HOSPADM

## 2023-06-22 RX ORDER — CEFAZOLIN SODIUM IN 0.9 % NACL 3 G/100 ML
3 INTRAVENOUS SOLUTION, PIGGYBACK (ML) INTRAVENOUS ONCE
Status: COMPLETED | OUTPATIENT
Start: 2023-06-22 | End: 2023-06-22

## 2023-06-22 RX ORDER — SIMETHICONE 80 MG
80 TABLET,CHEWABLE ORAL 4 TIMES DAILY PRN
Status: DISCONTINUED | OUTPATIENT
Start: 2023-06-22 | End: 2023-06-23 | Stop reason: HOSPADM

## 2023-06-22 RX ORDER — SODIUM CHLORIDE, SODIUM LACTATE, POTASSIUM CHLORIDE, CALCIUM CHLORIDE 600; 310; 30; 20 MG/100ML; MG/100ML; MG/100ML; MG/100ML
75 INJECTION, SOLUTION INTRAVENOUS CONTINUOUS
Status: DISCONTINUED | OUTPATIENT
Start: 2023-06-22 | End: 2023-06-23 | Stop reason: HOSPADM

## 2023-06-22 RX ORDER — OXYCODONE HYDROCHLORIDE 5 MG/1
5 TABLET ORAL ONCE
Status: COMPLETED | OUTPATIENT
Start: 2023-06-22 | End: 2023-06-22

## 2023-06-22 RX ORDER — HYDROMORPHONE HYDROCHLORIDE 2 MG/ML
0.5 INJECTION, SOLUTION INTRAMUSCULAR; INTRAVENOUS; SUBCUTANEOUS
Status: DISCONTINUED | OUTPATIENT
Start: 2023-06-22 | End: 2023-06-23 | Stop reason: HOSPADM

## 2023-06-22 RX ORDER — AMOXICILLIN 250 MG
2 CAPSULE ORAL 2 TIMES DAILY
Status: DISCONTINUED | OUTPATIENT
Start: 2023-06-22 | End: 2023-06-23 | Stop reason: HOSPADM

## 2023-06-22 RX ORDER — ONDANSETRON 2 MG/ML
4 INJECTION INTRAMUSCULAR; INTRAVENOUS EVERY 6 HOURS PRN
Status: DISCONTINUED | OUTPATIENT
Start: 2023-06-22 | End: 2023-06-23 | Stop reason: HOSPADM

## 2023-06-22 RX ORDER — GABAPENTIN 100 MG/1
100 CAPSULE ORAL 3 TIMES DAILY
Status: DISCONTINUED | OUTPATIENT
Start: 2023-06-22 | End: 2023-06-23 | Stop reason: HOSPADM

## 2023-06-22 RX ORDER — SODIUM CHLORIDE 0.9 % (FLUSH) 0.9 %
10 SYRINGE (ML) INJECTION EVERY 12 HOURS SCHEDULED
Status: DISCONTINUED | OUTPATIENT
Start: 2023-06-22 | End: 2023-06-22

## 2023-06-22 RX ORDER — AMLODIPINE BESYLATE 5 MG/1
5 TABLET ORAL DAILY
Status: DISCONTINUED | OUTPATIENT
Start: 2023-06-23 | End: 2023-06-23 | Stop reason: HOSPADM

## 2023-06-22 RX ORDER — FAMOTIDINE 20 MG/1
20 TABLET, FILM COATED ORAL ONCE
Status: COMPLETED | OUTPATIENT
Start: 2023-06-22 | End: 2023-06-22

## 2023-06-22 RX ORDER — PROMETHAZINE HYDROCHLORIDE 12.5 MG/1
12.5 SUPPOSITORY RECTAL EVERY 6 HOURS PRN
Status: DISCONTINUED | OUTPATIENT
Start: 2023-06-22 | End: 2023-06-23 | Stop reason: HOSPADM

## 2023-06-22 RX ORDER — SODIUM CHLORIDE 0.9 % (FLUSH) 0.9 %
10 SYRINGE (ML) INJECTION AS NEEDED
Status: DISCONTINUED | OUTPATIENT
Start: 2023-06-22 | End: 2023-06-22

## 2023-06-22 RX ORDER — ATORVASTATIN CALCIUM 20 MG/1
20 TABLET, FILM COATED ORAL NIGHTLY
Status: DISCONTINUED | OUTPATIENT
Start: 2023-06-22 | End: 2023-06-23 | Stop reason: HOSPADM

## 2023-06-22 RX ORDER — OXYCODONE HYDROCHLORIDE 5 MG/1
5 TABLET ORAL EVERY 4 HOURS PRN
Status: DISCONTINUED | OUTPATIENT
Start: 2023-06-22 | End: 2023-06-23 | Stop reason: HOSPADM

## 2023-06-22 RX ORDER — FAMOTIDINE 20 MG/1
20 TABLET, FILM COATED ORAL
Status: DISCONTINUED | OUTPATIENT
Start: 2023-06-22 | End: 2023-06-23 | Stop reason: HOSPADM

## 2023-06-22 RX ORDER — PROMETHAZINE HYDROCHLORIDE 12.5 MG/1
12.5 TABLET ORAL EVERY 6 HOURS PRN
Status: DISCONTINUED | OUTPATIENT
Start: 2023-06-22 | End: 2023-06-23 | Stop reason: HOSPADM

## 2023-06-22 RX ORDER — HEPARIN SODIUM 5000 [USP'U]/ML
5000 INJECTION, SOLUTION INTRAVENOUS; SUBCUTANEOUS ONCE
Status: COMPLETED | OUTPATIENT
Start: 2023-06-22 | End: 2023-06-22

## 2023-06-22 RX ORDER — SODIUM CHLORIDE, SODIUM LACTATE, POTASSIUM CHLORIDE, CALCIUM CHLORIDE 600; 310; 30; 20 MG/100ML; MG/100ML; MG/100ML; MG/100ML
9 INJECTION, SOLUTION INTRAVENOUS CONTINUOUS
Status: DISCONTINUED | OUTPATIENT
Start: 2023-06-22 | End: 2023-06-22

## 2023-06-22 RX ORDER — LORAZEPAM 0.5 MG/1
0.5 TABLET ORAL EVERY 6 HOURS PRN
Status: DISCONTINUED | OUTPATIENT
Start: 2023-06-22 | End: 2023-06-23 | Stop reason: HOSPADM

## 2023-06-22 RX ORDER — LEVOTHYROXINE SODIUM 0.03 MG/1
25 TABLET ORAL DAILY
Status: DISCONTINUED | OUTPATIENT
Start: 2023-06-23 | End: 2023-06-23 | Stop reason: HOSPADM

## 2023-06-22 RX ORDER — ESCITALOPRAM OXALATE 10 MG/1
10 TABLET ORAL DAILY
Status: DISCONTINUED | OUTPATIENT
Start: 2023-06-23 | End: 2023-06-23 | Stop reason: HOSPADM

## 2023-06-22 RX ORDER — IBUPROFEN 600 MG/1
600 TABLET ORAL EVERY 6 HOURS PRN
Status: DISCONTINUED | OUTPATIENT
Start: 2023-06-22 | End: 2023-06-23 | Stop reason: HOSPADM

## 2023-06-22 RX ORDER — CELECOXIB 200 MG/1
200 CAPSULE ORAL ONCE
Status: COMPLETED | OUTPATIENT
Start: 2023-06-22 | End: 2023-06-22

## 2023-06-22 RX ORDER — MIDAZOLAM HYDROCHLORIDE 1 MG/ML
1 INJECTION INTRAMUSCULAR; INTRAVENOUS
Status: DISCONTINUED | OUTPATIENT
Start: 2023-06-22 | End: 2023-06-22

## 2023-06-22 RX ORDER — FENTANYL CITRATE 50 UG/ML
INJECTION, SOLUTION INTRAMUSCULAR; INTRAVENOUS
Status: COMPLETED
Start: 2023-06-22 | End: 2023-06-22

## 2023-06-22 RX ORDER — CETIRIZINE HYDROCHLORIDE 10 MG/1
10 TABLET ORAL ONCE
Status: COMPLETED | OUTPATIENT
Start: 2023-06-22 | End: 2023-06-22

## 2023-06-22 RX ORDER — NALOXONE HCL 0.4 MG/ML
0.4 VIAL (ML) INJECTION
Status: DISCONTINUED | OUTPATIENT
Start: 2023-06-22 | End: 2023-06-23 | Stop reason: HOSPADM

## 2023-06-22 RX ORDER — FENTANYL CITRATE 50 UG/ML
50 INJECTION, SOLUTION INTRAMUSCULAR; INTRAVENOUS
Status: DISCONTINUED | OUTPATIENT
Start: 2023-06-22 | End: 2023-06-22

## 2023-06-22 RX ORDER — MAGNESIUM HYDROXIDE 1200 MG/15ML
LIQUID ORAL AS NEEDED
Status: DISCONTINUED | OUTPATIENT
Start: 2023-06-22 | End: 2023-06-22 | Stop reason: HOSPADM

## 2023-06-22 RX ORDER — NYSTATIN 100000 [USP'U]/G
POWDER TOPICAL EVERY 8 HOURS PRN
Status: DISCONTINUED | OUTPATIENT
Start: 2023-06-22 | End: 2023-06-23 | Stop reason: HOSPADM

## 2023-06-22 RX ORDER — ONDANSETRON 4 MG/1
4 TABLET, FILM COATED ORAL EVERY 6 HOURS PRN
Status: DISCONTINUED | OUTPATIENT
Start: 2023-06-22 | End: 2023-06-23 | Stop reason: HOSPADM

## 2023-06-22 RX ADMIN — CELECOXIB 200 MG: 200 CAPSULE ORAL at 12:16

## 2023-06-22 RX ADMIN — ACETAMINOPHEN 1000 MG: 500 TABLET ORAL at 12:17

## 2023-06-22 RX ADMIN — FENTANYL CITRATE 50 MCG: 50 INJECTION, SOLUTION INTRAMUSCULAR; INTRAVENOUS at 15:30

## 2023-06-22 RX ADMIN — ACETAMINOPHEN 650 MG: 325 TABLET ORAL at 23:01

## 2023-06-22 RX ADMIN — FAMOTIDINE 20 MG: 20 TABLET ORAL at 12:17

## 2023-06-22 RX ADMIN — SODIUM CHLORIDE, POTASSIUM CHLORIDE, SODIUM LACTATE AND CALCIUM CHLORIDE 9 ML/HR: 600; 310; 30; 20 INJECTION, SOLUTION INTRAVENOUS at 12:17

## 2023-06-22 RX ADMIN — PREGABALIN 150 MG: 150 CAPSULE ORAL at 12:17

## 2023-06-22 RX ADMIN — OXYCODONE HYDROCHLORIDE 5 MG: 5 TABLET ORAL at 18:02

## 2023-06-22 RX ADMIN — HEPARIN SODIUM 5000 UNITS: 5000 INJECTION INTRAVENOUS; SUBCUTANEOUS at 12:17

## 2023-06-22 RX ADMIN — CETIRIZINE HYDROCHLORIDE 10 MG: 10 TABLET, FILM COATED ORAL at 22:17

## 2023-06-22 RX ADMIN — SENNOSIDES AND DOCUSATE SODIUM 2 TABLET: 50; 8.6 TABLET ORAL at 20:45

## 2023-06-22 RX ADMIN — GABAPENTIN 100 MG: 100 CAPSULE ORAL at 20:45

## 2023-06-22 RX ADMIN — HYDROMORPHONE HYDROCHLORIDE 0.5 MG: 1 INJECTION, SOLUTION INTRAMUSCULAR; INTRAVENOUS; SUBCUTANEOUS at 15:22

## 2023-06-22 RX ADMIN — ATORVASTATIN CALCIUM 20 MG: 20 TABLET, FILM COATED ORAL at 20:45

## 2023-06-22 RX ADMIN — ACETAMINOPHEN 650 MG: 325 TABLET ORAL at 18:01

## 2023-06-22 RX ADMIN — Medication 0.5 MG: at 15:22

## 2023-06-22 RX ADMIN — NYSTATIN: 100000 POWDER TOPICAL at 22:17

## 2023-06-22 RX ADMIN — OXYCODONE HYDROCHLORIDE 5 MG: 5 TABLET ORAL at 12:19

## 2023-06-22 RX ADMIN — OXYCODONE HYDROCHLORIDE 5 MG: 5 TABLET ORAL at 22:57

## 2023-06-22 RX ADMIN — SODIUM CHLORIDE, POTASSIUM CHLORIDE, SODIUM LACTATE AND CALCIUM CHLORIDE 75 ML/HR: 600; 310; 30; 20 INJECTION, SOLUTION INTRAVENOUS at 17:56

## 2023-06-22 RX ADMIN — FAMOTIDINE 20 MG: 20 TABLET ORAL at 18:02

## 2023-06-22 RX ADMIN — LIDOCAINE HYDROCHLORIDE 0.5 ML: 10 INJECTION, SOLUTION EPIDURAL; INFILTRATION; INTRACAUDAL; PERINEURAL at 12:17

## 2023-06-22 NOTE — INTERVAL H&P NOTE
"[facility] Pre-op    Full history and physical note from office is attached.    /69 (BP Location: Right arm, Patient Position: Lying)   Pulse 60   Temp 97.5 °F (36.4 °C) (Temporal)   Resp 18   Ht 170.2 cm (67\")   Wt 120 kg (264 lb)   SpO2 96%   BMI 41.35 kg/m²       LAB Results:  Lab Results   Component Value Date    WBC 8.73 05/05/2023    HGB 13.2 05/05/2023    HCT 40.2 05/05/2023    MCV 88.5 05/05/2023     05/05/2023    NEUTROABS 5.20 05/05/2023    GLUCOSE 94 05/05/2023    BUN 16 05/05/2023    CREATININE 0.66 05/05/2023     05/05/2023    K 3.5 05/05/2023     05/05/2023    CO2 28.0 05/05/2023    CALCIUM 9.5 05/05/2023    ALBUMIN 4.4 05/05/2023    AST 23 05/05/2023    ALT 34 (H) 05/05/2023    BILITOT 0.3 05/05/2023     Clinical Information    Vulvar cancer   Final Diagnosis   VULVA BIOPSY:  Invasive squamous cell carcinoma, estimated depth of invasion 4 mm, with total tumor size greater than 2 cm (see comment).   Electronically signed by Dillan Sands MD on 5/23/2023 at 1144   Preliminary Diagnosis    VULVA, BIOPSY:  Pending external consultation (see comment).   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  This case was referred to Dr. Yobany Recinos at [facility] in consultation.  The consultant feels that there are extensive areas of invasion, including areas of bulbous invasion.  He feels the exact depth of invasion is difficult to establish, but estimated depth of at least 4 mm stating it could be more.  No lymphovascular invasion is identified.  Please see consult report for additional discussion.       Total invasive tumor size is felt to be greater than 2 cm based upon the size of the gross specimen.  Immunohistochemical staining shows the tumor to be strongly positive for p16 in the majority of areas.  The proliferation marker Ki-67 is markedly elevated being positive in approximately 33% of cells, and " "marked cells throughout the full-thickness.   Gross Description    1. Vulva.  Received in formalin labeled \"vulvar region\" are at least 10 fragments of tan-pink, friable, verrucoid soft tissue ranging from 0.7 x 0.7 x 0.6 cm to 4 x 3 x 2 cm.  The identifiable surgical margin on the largest piece of tissue is inked blue and sectioning reveals a tan-brown, glistening cut surface.  The largest piece of tissue to include the perpendicular surgical margin is submitted entirely in blocks 1 A- 1E.  Representative sections of the smaller smaller, friable fragments are submitted in block 1F. HDM      Cancer Staging (if applicable)  Cancer Patient: _x_ yes __no __unknown__N/A; If yes, clinical stage T:_2_ N:_0_M:__0, stage group II      Impression: Vulvar cancer      Plan: BILATERAL INGUINOFEMORAL LYMPH NODE DISSECTION; VULVECTOMY RADICAL       Ernestine VillaseñorCHANCE   6/22/2023   11:56 EDT  Late entry:  I saw and evaluated the patient prior to surgery. I agree with the findings and the plan of care as documented in the note.  All questions answered.    Maxine Sanches MD  06/23/23  08:16 EDT    "

## 2023-06-22 NOTE — PAYOR COMM NOTE
"Lizette Castro RN  Utilization Management  P:[phone]  F:[phone]    Ref # [id number]    Ellie Rajan (62 y.o. Female)     Date of Birth   1960    Social Security Number       Address   [address] [place] [address]    Home Phone   [phone]    MRN   [id number]       Jew   Caodaism    Marital Status   Single                            Admission Date   6/22/23    Admission Type   Elective    Admitting Provider   Maxine Sanches MD    Attending Provider   Maxine Sanches MD    Department, Room/Bed   [facility] OR, [facility] OR/MAIN OR       Discharge Date       Discharge Disposition       Discharge Destination                               Attending Provider: Maxine Sanches MD    Allergies: Sulfa Antibiotics    Isolation: None   Infection: None   Code Status: Not on file    Ht: 170.2 cm (67\")   Wt: 120 kg (264 lb)    Admission Cmt: None   Principal Problem: Vulvar cancer [C51.9]                 Active Insurance as of 6/22/2023     Primary Coverage     Payor Plan Insurance Group Employer/Plan Group    [place] MEDICAID [facility] MEDICAID [id number]     Payor Plan Address Payor Plan Phone Number Payor Plan Fax Number Effective Dates    [address] [address] [phone]  4/18/2023 - None Entered    [facility] [id number]       Subscriber Name Subscriber Birth Date Member ID       ELLIE RAJAN 1960 [id number]                 Emergency Contacts      (Rel.) Home Phone Work Phone Mobile Phone    BENJAMIN REILLY (Relative) -- -- [phone]    FEROZ OLIVER (Relative) -- -- [phone]               History & Physical      Ernestine Villaseñor APRN at 06/22/23 [facility]          [facility] Pre-op    Full history and physical note from office is attached.    /69 (BP Location: Right arm, Patient Position: Lying)   Pulse 60   Temp 97.5 °F (36.4 °C) (Temporal)   Resp 18   Ht 170.2 cm (67\")   Wt 120 kg (264 lb)   SpO2 96%   BMI 41.35 " "kg/m²       LAB Results:  Lab Results   Component Value Date    WBC 8.73 05/05/2023    HGB 13.2 05/05/2023    HCT 40.2 05/05/2023    MCV 88.5 05/05/2023     05/05/2023    NEUTROABS 5.20 05/05/2023    GLUCOSE 94 05/05/2023    BUN 16 05/05/2023    CREATININE 0.66 05/05/2023     05/05/2023    K 3.5 05/05/2023     05/05/2023    CO2 28.0 05/05/2023    CALCIUM 9.5 05/05/2023    ALBUMIN 4.4 05/05/2023    AST 23 05/05/2023    ALT 34 (H) 05/05/2023    BILITOT 0.3 05/05/2023     Clinical Information    Vulvar cancer   Final Diagnosis   VULVA BIOPSY:  Invasive squamous cell carcinoma, estimated depth of invasion 4 mm, with total tumor size greater than 2 cm (see comment).   Electronically signed by Dillan Sands MD on 5/23/2023 at 1144   Preliminary Diagnosis    VULVA, BIOPSY:  Pending external consultation (see comment).   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  This case was referred to Dr. Yobany Recinos at [facility] in consultation.  The consultant feels that there are extensive areas of invasion, including areas of bulbous invasion.  He feels the exact depth of invasion is difficult to establish, but estimated depth of at least 4 mm stating it could be more.  No lymphovascular invasion is identified.  Please see consult report for additional discussion.       Total invasive tumor size is felt to be greater than 2 cm based upon the size of the gross specimen.  Immunohistochemical staining shows the tumor to be strongly positive for p16 in the majority of areas.  The proliferation marker Ki-67 is markedly elevated being positive in approximately 33% of cells, and marked cells throughout the full-thickness.   Gross Description    1. Vulva.  Received in formalin labeled \"vulvar region\" are at least 10 fragments of tan-pink, friable, verrucoid soft tissue ranging from 0.7 x 0.7 x 0.6 cm to 4 x 3 x 2 cm.  The identifiable surgical margin on the " largest piece of tissue is inked blue and sectioning reveals a tan-brown, glistening cut surface.  The largest piece of tissue to include the perpendicular surgical margin is submitted entirely in blocks 1 A- 1E.  Representative sections of the smaller smaller, friable fragments are submitted in block 1F. HDM      Cancer Staging (if applicable)  Cancer Patient: __ yes __no __unknown__N/A; If yes, clinical stage T:__ N:__M:__, stage group or __N/A      Impression: Vulvar cancer      Plan: BILATERAL INGUINOFEMORAL LYMPH NODE DISSECTION; VULVECTOMY RADICAL       CHANCE Santacruz   2023   11:56 EDT    Electronically signed by Ernestine Villaseñor APRN at 23 1150   Source Note          Ellie Rajan  [id number]  1960      Reason for Visit:  Treatment planning for newly diagnosed vulvar SCC, Postoperative evaluation    History of Present Illness:  Patient is a very pleasant 62 y.o. woman who presents for a post operative evaluation status post vulvar biopsy performed on 5/15/23.      Surgery and hospital course were uncomplicated.  Today, patient notes normal bowel and bladder function.  Her pain is greatly improved when compared to preoperatively.  She presents for discussion of treatment options.  Patient did reschedule her appointment as her father  last week.    Past Medical History, Past Surgical History, Social History, Family History have been reviewed and are without significant changes except as mentioned.    Review of Systems   All other systems were reviewed and are negative except as mentioned above.    Medications:  The current medication list was reviewed in the EMR    ALLERGIES:    Allergies   Allergen Reactions   • Sulfa Antibiotics Hives         /62   Pulse 62   Temp 96.9 °F (36.1 °C)   Wt 120 kg (264 lb 14.4 oz)   SpO2 98%   BMI 41.49 kg/m²         Physical Exam  Constitutional:  Patient is a pleasant woman in no acute distress.  Extremities:  Bilateral lower extremities  are non-tender.  Gynecologic: Area of excisional biopsy continues to be ulcerated at the bilateral anterior labia.  Pain greatly improved on examination.  Exam still somewhat limited due to discomfort.    PATHOLOGY:  Final Diagnosis   VULVA BIOPSY:  Invasive squamous cell carcinoma, estimated depth of invasion 4 mm, with total tumor size greater than 2 cm (see comment).   Electronically signed by Dillan Sands MD on 5/23/2023 at 1144   Preliminary Diagnosis    VULVA, BIOPSY:  Pending external consultation (see comment).   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  This case was referred to Dr. Yobany Recinos at [facility] in consultation.  The consultant feels that there are extensive areas of invasion, including areas of bulbous invasion.  He feels the exact depth of invasion is difficult to establish, but estimated depth of at least 4 mm stating it could be more.  No lymphovascular invasion is identified.  Please see consult report for additional discussion.       Total invasive tumor size is felt to be greater than 2 cm based upon the size of the gross specimen.  Immunohistochemical staining shows the tumor to be strongly positive for p16 in the majority of areas.  The proliferation marker Ki-67 is markedly elevated being positive in approximately 33% of cells, and marked cells throughout the full-thickness.   NM PET/CT Skull Base to Mid Thigh    Result Date: 5/23/2023    1. Focal, somewhat linear area of increased uptake in the region of the vulva, likely consistent with patient's given history of vulvar malignancy. 2. No evidence of metastatic disease is identified.     PATRICK NICOLE MD       Electronically Signed and Approved By: PATRICK NICOLE MD on 5/23/2023 at 10:26            I personally reviewed the PET/CT imaging and agree that there is no findings concerning for metastatic disease.    ASSESSMENT/PLAN:  Ellie Rajan returns for a post-operative  evaluation today.  All pathology reports were discussed with the patient.      Patient requested refill of her Tylenol and ibuprofen today.  She thinks it helps quite a bit with her arthritis.      We discussed options of radiation versus surgical excision.  Initially, I was concerned that the tumor was involving the urethral meatus and was not amenable to primary surgical resection.  However, at the time of surgery I was able to visualize the urethral meatus and I think there is an appropriate margin that can be obtained.  I discussed that this with patient and family today.  I think she would actually be a good surgical candidate now that I can visualize the area of concern better.  We discussed pros and cons of primary radiation versus resection.  We discussed risk of damage to vessels, nerves and lymphedema is a long-term consequences related to surgery.  We discussed that some of these issues can also occur with radiation treatment.  We discussed need for JAE drain placement at bilateral lymph node dissection areas and 24-hour hospital stay to discussed wound care and JAE care prior to discharge home.  We discussed follow-up postoperatively and likelihood of wound care due to incomplete closure of wounds or incisional separation..  After counseling, patient was agreeable to proceed with surgery.  Despite the negative PET scan, inguinofemoral lymph node dissection is indicated as any metastatic disease could impact adjuvant treatment and recurrence outside of the primary tumor is very challenging from a curative standpoint.    Patient was consented for modified radical vulvectomy, bilateral inguinofemoral lymph node dissection.      Risks and benefits of surgery were discussed.  This included, but was not limited to, infection and bleeding like when the skin is cut; damage to surrounding structures; and incisional complications.  Risk of DVT was addressed for major surgeries.  Standard of care efforts to minimize  these risks were reviewed.  Typical hospital stay and recovery were discussed as well as post-procedure precautions.  Surgical implications of chronic illnesses on recovery and surgical outcome were reviewed.   Pain medication regimen for postoperative care was discussed.  Typical regimen and avoidance of narcotics was discussed.  Patient was educated that other factors, such as existing narcotic use, can impact postoperative pain management.   Patient verbalized understanding of the plan including the risks and benefits.  Appropriate perioperative testing including laboratory evaluation, EKG as clinically indicated, chest x-ray as clinically indicated, and preadmission evaluation were all ordered as a part of this patient's care.    She is to follow-up for surgery in the near future.    I spent 35 minutes caring for Ellie on this date of service. This time includes time spent by me in the following activities: preparing for the visit, reviewing tests, performing a medically appropriate examination and/or evaluation, counseling and educating the patient/family/caregiver, ordering medications, tests, or procedures, referring and communicating with other health care professionals, documenting information in the medical record, and independently interpreting results and communicating that information with the patient/family/caregiver    Patient was seen and examined with Dr. Serrano,  resident, who performed portions of the examination and documentation for this patient's care under my direct supervision.  I agree with the above documentation and plan.    Maxine Sanches MD  06/07/23  12:10 EDT               Electronically signed by Maxine Sanches MD at 06/07/23 1214             Jose Serrano MD at 06/07/23 1100          Ellie Rajan  [id number]  1960      Reason for Visit:  Treatment planning for newly diagnosed vulvar SCC, Postoperative evaluation    History of Present Illness:  Patient is a very  pleasant 62 y.o. woman who presents for a post operative evaluation status post vulvar biopsy performed on 5/15/23.      Surgery and hospital course were uncomplicated.  Today, patient notes normal bowel and bladder function.  Her pain is greatly improved when compared to preoperatively.  She presents for discussion of treatment options.  Patient did reschedule her appointment as her father  last week.    Past Medical History, Past Surgical History, Social History, Family History have been reviewed and are without significant changes except as mentioned.    Review of Systems   All other systems were reviewed and are negative except as mentioned above.    Medications:  The current medication list was reviewed in the EMR    ALLERGIES:    Allergies   Allergen Reactions   • Sulfa Antibiotics Hives         /62   Pulse 62   Temp 96.9 °F (36.1 °C)   Wt 120 kg (264 lb 14.4 oz)   SpO2 98%   BMI 41.49 kg/m²         Physical Exam  Constitutional:  Patient is a pleasant woman in no acute distress.  Extremities:  Bilateral lower extremities are non-tender.  Gynecologic: Area of excisional biopsy continues to be ulcerated at the bilateral anterior labia.  Pain greatly improved on examination.  Exam still somewhat limited due to discomfort.    PATHOLOGY:  Final Diagnosis   VULVA BIOPSY:  Invasive squamous cell carcinoma, estimated depth of invasion 4 mm, with total tumor size greater than 2 cm (see comment).   Electronically signed by Dillan Sands MD on 2023 at 1144   Preliminary Diagnosis    VULVA, BIOPSY:  Pending external consultation (see comment).   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  This case was referred to Dr. Yobany Recinos at [facility] in consultation.  The consultant feels that there are extensive areas of invasion, including areas of bulbous invasion.  He feels the exact depth of invasion is difficult to establish, but estimated  depth of at least 4 mm stating it could be more.  No lymphovascular invasion is identified.  Please see consult report for additional discussion.       Total invasive tumor size is felt to be greater than 2 cm based upon the size of the gross specimen.  Immunohistochemical staining shows the tumor to be strongly positive for p16 in the majority of areas.  The proliferation marker Ki-67 is markedly elevated being positive in approximately 33% of cells, and marked cells throughout the full-thickness.   NM PET/CT Skull Base to Mid Thigh    Result Date: 5/23/2023    1. Focal, somewhat linear area of increased uptake in the region of the vulva, likely consistent with patient's given history of vulvar malignancy. 2. No evidence of metastatic disease is identified.     PATRICK NICOLE MD       Electronically Signed and Approved By: PATRICK NICOLE MD on 5/23/2023 at 10:26            I personally reviewed the PET/CT imaging and agree that there is no findings concerning for metastatic disease.    ASSESSMENT/PLAN:  Ellie Rajan returns for a post-operative evaluation today.  All pathology reports were discussed with the patient.      Patient requested refill of her Tylenol and ibuprofen today.  She thinks it helps quite a bit with her arthritis.      We discussed options of radiation versus surgical excision.  Initially, I was concerned that the tumor was involving the urethral meatus and was not amenable to primary surgical resection.  However, at the time of surgery I was able to visualize the urethral meatus and I think there is an appropriate margin that can be obtained.  I discussed that this with patient and family today.  I think she would actually be a good surgical candidate now that I can visualize the area of concern better.  We discussed pros and cons of primary radiation versus resection.  We discussed risk of damage to vessels, nerves and lymphedema is a long-term consequences related to surgery.  We discussed that  some of these issues can also occur with radiation treatment.  We discussed need for JAE drain placement at bilateral lymph node dissection areas and 24-hour hospital stay to discussed wound care and JAE care prior to discharge home.  We discussed follow-up postoperatively and likelihood of wound care due to incomplete closure of wounds or incisional separation..  After counseling, patient was agreeable to proceed with surgery.  Despite the negative PET scan, inguinofemoral lymph node dissection is indicated as any metastatic disease could impact adjuvant treatment and recurrence outside of the primary tumor is very challenging from a curative standpoint.    Patient was consented for modified radical vulvectomy, bilateral inguinofemoral lymph node dissection.      Risks and benefits of surgery were discussed.  This included, but was not limited to, infection and bleeding like when the skin is cut; damage to surrounding structures; and incisional complications.  Risk of DVT was addressed for major surgeries.  Standard of care efforts to minimize these risks were reviewed.  Typical hospital stay and recovery were discussed as well as post-procedure precautions.  Surgical implications of chronic illnesses on recovery and surgical outcome were reviewed.   Pain medication regimen for postoperative care was discussed.  Typical regimen and avoidance of narcotics was discussed.  Patient was educated that other factors, such as existing narcotic use, can impact postoperative pain management.   Patient verbalized understanding of the plan including the risks and benefits.  Appropriate perioperative testing including laboratory evaluation, EKG as clinically indicated, chest x-ray as clinically indicated, and preadmission evaluation were all ordered as a part of this patient's care.    She is to follow-up for surgery in the near future.    I spent 35 minutes caring for Ellie on this date of service. This time includes time spent  by me in the following activities: preparing for the visit, reviewing tests, performing a medically appropriate examination and/or evaluation, counseling and educating the patient/family/caregiver, ordering medications, tests, or procedures, referring and communicating with other health care professionals, documenting information in the medical record, and independently interpreting results and communicating that information with the patient/family/caregiver    Patient was seen and examined with Dr. Serrano,  resident, who performed portions of the examination and documentation for this patient's care under my direct supervision.  I agree with the above documentation and plan.    Maxine Sanches MD  06/07/23  12:10 EDT               Electronically signed by Maxine Sanches MD at 06/07/23 1214          Operative/Procedure Notes (all)      Maxine Sanches MD at 06/22/23 1311  Version 1 of 1           Subjective     Date of Service:  06/22/23  Time of Service:  15:20 EDT    Surgical Staff: Surgeon(s) and Role:     * Maxine Sanches MD - Primary   Additional Staff:   Assistant: Fco Cade PA-C and CASSANDRA Troy  was responsible for performing the following activities: Retraction, Suction, Irrigation, Closing, and Placing Dressing and their skilled assistance was necessary for the success of this case.     Pre-operative diagnosis(es): Pre-Op Diagnosis Codes:     * Vulvar cancer [C51.9]     Post-operative diagnosis(es): Post-Op Diagnosis Codes:     * Vulvar cancer [C51.9]   Procedure(s): Procedure(s):  BILATERAL INGUINOFEMORAL LYMPH NODE DISSECTION  VULVECTOMY RADICAL     Antibiotics: cefazolin (Ancef) ordered on call to OR     Anesthesia: Type: General  ASA:  III     Objective      Operative findings: No grossly positive lymph nodes were encountered.  Residual tumor was resected and was noted to be essentially discolored epithelium at the anterior aspect of the vulva at the clitoris and extending  towards the urethra.  Urethra was not involved.  Specimen was sent oriented on a board to pathology.  Residual tumor 4 cm in maximal dimension.   Specimens removed: ID Type Source Tests Collected by Time   A : left inguinal femoral lymph node for permanent Tissue Lymph Node TISSUE PATHOLOGY EXAM Maxine Sanches MD 6/22/2023 1344   B : right inguinal femoral lymph node for permanent Tissue Lymph Node TISSUE PATHOLOGY EXAM Maxine Sanches MD 6/22/2023 1413   C : anterior vulva for orientation Tissue Vulva TISSUE PATHOLOGY EXAM Maxine Sanches MD 6/22/2023 1433      Fluid Intake and Output: I/O this shift:  In: 1200 [I.V.:1200]  Out: - EBL 50 mL   Blood products used: No   Drains: Closed/Suction Drain 1 Left;Anterior Hip Bulb 15 Fr. (Active)       Closed/Suction Drain 2 Right;Anterior Hip 15 Fr. (Active)       Urethral Catheter Silicone 16 Fr. (Active)      Implant Information: Implant Name Type Inv. Item Serial No.  Lot No. LRB No. Used Action   CLIPAPPLR M/ ENDO LIGACLIP 13IN LG - [id number] Implant CLIPAPPLR M/ ENDO LIGACLIP 13IN LG  [employer]  DIV OF J [employer] J [id number] Left 1 Implanted   CLIPAPPLR M/ ENDO [id number] 3/8IN MD Marcie [id number] Implant CLIPAPPLR M/ ENDO [id number] 3/8IN MD  [employer]  DIV [employer] J [employer] J [id number] Left 1 Implanted      Complications: No immediate   Intraoperative consult(s):    Condition: stable   Disposition: to PACU and then admit to  medical - surgical floor       Indications:  Patient is a pleasant 62-year-old woman who was diagnosed with a clinical stage II vulvar cancer.  Risk benefits and alternatives to surgical intervention were discussed at length.  Consent was signed on chart.    Procedure: After obtaining informed consent, patient was taken to the operating room and underwent general endotracheal anesthesia after patient and site verification.  Feet were placed in Liam stirrups.  Tables were used to retract pannus.  Abdomen was prepped from  umbilicus inferiorly to the knees and vagina and surrounding structures were prepped.  Surgical field was draped in the usual fashion.  Half percent Marcaine with epinephrine was used for local anesthesia.  Bilateral inguinofemoral lymph node dissection was performed according to the usual anatomic landmarks.  Bony landmarks were identified and skin sharply incised.  Kaur's fascia was divided.  Lymph nodes were circumferentially divided.  Hand-held harmonic, hemoclips, and Metzenbaum scissors were used to facilitate removal.  Incisions were irrigated, aspirated, and made hemostatic.  15 round Tito-Ybarra drains were placed and secured to the skin using 2-0 silk in the usual fashion.  2-0 Vicryl was used in a layered fashion to reapproximate the subcutaneous adipose tissue.  Skin was closed with 3-0 Monocryl in a subcuticular stitch.  Glue was placed at the skin incisions.  Dressings were placed at the drain sites.  Attention was turned to the vulva.  Area of concern was outlined with the marking pen.  Scalpel was used to sharply incise the skin.  Bovie electrocautery was used to deepen the incision.  Hand-held harmonic was used to divide the specimen from the patient.  Specimen was placed on the orientation board and sent to pathology.  Surgical field was irrigated and aspirated.  Additional hemostasis was achieved with hand-held harmonic.  2-0 Vicryl suture was used in a figure-of-eight layered fashion to reapproximate the subcutaneous adipose and underlying tissues.  2-0 Vicryl was used in a interrupted figure-of-eight fashion to reapproximate the most inferior portion of the incision which was 1.5 to 2 cm away from the urethral meatus.  3-0 Vicryl was used in a subcutaneous stitch to reapproximate the skin.  No dressing was placed.  Patient was taken to the recovery room in stable condition.  There were no immediate complications.  All counts were correct.        Maxine Sanches MD  06/22/23  15:20  EDT      Electronically signed by Maxine Sanches MD at 06/22/23 8552

## 2023-06-22 NOTE — OP NOTE
Subjective     Date of Service:  06/22/23  Time of Service:  15:20 EDT    Surgical Staff: Surgeon(s) and Role:     * Maxine Sanches MD - Primary   Additional Staff:   Assistant: Fco Cade PA-C and CASSANDRA Troy  was responsible for performing the following activities: Retraction, Suction, Irrigation, Closing, and Placing Dressing and their skilled assistance was necessary for the success of this case.     Pre-operative diagnosis(es): Pre-Op Diagnosis Codes:     * Vulvar cancer [C51.9]     Post-operative diagnosis(es): Post-Op Diagnosis Codes:     * Vulvar cancer [C51.9]   Procedure(s): Procedure(s):  BILATERAL INGUINOFEMORAL LYMPH NODE DISSECTION  VULVECTOMY RADICAL     Antibiotics: cefazolin (Ancef) ordered on call to OR     Anesthesia: Type: General  ASA:  III     Objective      Operative findings: No grossly positive lymph nodes were encountered.  Residual tumor was resected and was noted to be essentially discolored epithelium at the anterior aspect of the vulva at the clitoris and extending towards the urethra.  Urethra was not involved.  Specimen was sent oriented on a board to pathology.  Residual tumor 4 cm in maximal dimension.   Specimens removed: ID Type Source Tests Collected by Time   A : left inguinal femoral lymph node for permanent Tissue Lymph Node TISSUE PATHOLOGY EXAM Maxine Sanches MD 6/22/2023 1344   B : right inguinal femoral lymph node for permanent Tissue Lymph Node TISSUE PATHOLOGY EXAM Maxine Sanches MD 6/22/2023 1413   C : anterior vulva for orientation Tissue Vulva TISSUE PATHOLOGY EXAM Maxine Sanches MD 6/22/2023 1433      Fluid Intake and Output: I/O this shift:  In: 1200 [I.V.:1200]  Out: - EBL 50 mL   Blood products used: No   Drains: Closed/Suction Drain 1 Left;Anterior Hip Bulb 15 Fr. (Active)       Closed/Suction Drain 2 Right;Anterior Hip 15 Fr. (Active)       Urethral Catheter Silicone 16 Fr. (Active)      Implant Information: Implant Name Type Inv. Item  Serial No.  Lot No. LRB No. Used Action   CLIPAPPLR M/ ENDO LIGACLIP 13IN LG - [id number] Implant CLIPAPPLR M/ ENDO LIGACLIP 13IN LG  [employer]  [employer] J [employer] J [id number] Left 1 Implanted   CLIPAPPLR M/ ENDO LIGACLIP9 3/8IN MD  [id number] Implant CLIPAPPLR M/ ENDO LIGACLIP9 3/8IN MD  [employer]  [employer] J [employer] J [id number] Left 1 Implanted      Complications: No immediate   Intraoperative consult(s):    Condition: stable   Disposition: to PACU and then admit to  medical - surgical floor       Indications:  Patient is a pleasant 62-year-old woman who was diagnosed with a clinical stage II vulvar cancer.  Risk benefits and alternatives to surgical intervention were discussed at length.  Consent was signed on chart.    Procedure: After obtaining informed consent, patient was taken to the operating room and underwent general endotracheal anesthesia after patient and site verification.  Feet were placed in Liam stirrups.  Tables were used to retract pannus.  Abdomen was prepped from umbilicus inferiorly to the knees and vagina and surrounding structures were prepped.  Surgical field was draped in the usual fashion.  Half percent Marcaine with epinephrine was used for local anesthesia.  Bilateral inguinofemoral lymph node dissection was performed according to the usual anatomic landmarks.  Bony landmarks were identified and skin sharply incised.  Kaur's fascia was divided.  Lymph nodes were circumferentially divided.  Hand-held harmonic, hemoclips, and Metzenbaum scissors were used to facilitate removal.  Incisions were irrigated, aspirated, and made hemostatic.  15 round Tito-Ybarra drains were placed and secured to the skin using 2-0 silk in the usual fashion.  2-0 Vicryl was used in a layered fashion to reapproximate the subcutaneous adipose tissue.  Skin was closed with 3-0 Monocryl in a subcuticular stitch.  Glue was placed at the skin incisions.  Dressings were placed at the drain  sites.  Attention was turned to the vulva.  Area of concern was outlined with the marking pen.  Scalpel was used to sharply incise the skin.  Bovie electrocautery was used to deepen the incision.  Hand-held harmonic was used to divide the specimen from the patient.  Specimen was placed on the orientation board and sent to pathology.  Surgical field was irrigated and aspirated.  Additional hemostasis was achieved with hand-held harmonic.  2-0 Vicryl suture was used in a figure-of-eight layered fashion to reapproximate the subcutaneous adipose and underlying tissues.  2-0 Vicryl was used in a interrupted figure-of-eight fashion to reapproximate the most inferior portion of the incision which was 1.5 to 2 cm away from the urethral meatus.  3-0 Vicryl was used in a subcutaneous stitch to reapproximate the skin.  No dressing was placed.  Patient was taken to the recovery room in stable condition.  There were no immediate complications.  All counts were correct.        Maxine Sanches MD  06/22/23  15:20 EDT

## 2023-06-23 VITALS
HEART RATE: 58 BPM | OXYGEN SATURATION: 94 % | BODY MASS INDEX: 41.44 KG/M2 | HEIGHT: 67 IN | TEMPERATURE: 97.9 F | WEIGHT: 264 LBS | DIASTOLIC BLOOD PRESSURE: 75 MMHG | RESPIRATION RATE: 18 BRPM | SYSTOLIC BLOOD PRESSURE: 135 MMHG

## 2023-06-23 LAB
ANION GAP SERPL CALCULATED.3IONS-SCNC: 14 MMOL/L (ref 5–15)
BASOPHILS # BLD AUTO: 0.02 10*3/MM3 (ref 0–0.2)
BASOPHILS NFR BLD AUTO: 0.2 % (ref 0–1.5)
BUN SERPL-MCNC: 15 MG/DL (ref 8–23)
BUN/CREAT SERPL: 25 (ref 7–25)
CALCIUM SPEC-SCNC: 8.6 MG/DL (ref 8.6–10.5)
CHLORIDE SERPL-SCNC: 100 MMOL/L (ref 98–107)
CO2 SERPL-SCNC: 22 MMOL/L (ref 22–29)
CREAT SERPL-MCNC: 0.6 MG/DL (ref 0.57–1)
DEPRECATED RDW RBC AUTO: 42.7 FL (ref 37–54)
EGFRCR SERPLBLD CKD-EPI 2021: 102.9 ML/MIN/1.73
EOSINOPHIL # BLD AUTO: 0 10*3/MM3 (ref 0–0.4)
EOSINOPHIL NFR BLD AUTO: 0 % (ref 0.3–6.2)
ERYTHROCYTE [DISTWIDTH] IN BLOOD BY AUTOMATED COUNT: 13.2 % (ref 12.3–15.4)
GLUCOSE SERPL-MCNC: 123 MG/DL (ref 65–99)
HCT VFR BLD AUTO: 35.3 % (ref 34–46.6)
HGB BLD-MCNC: 11.9 G/DL (ref 12–15.9)
IMM GRANULOCYTES # BLD AUTO: 0.08 10*3/MM3 (ref 0–0.05)
IMM GRANULOCYTES NFR BLD AUTO: 0.6 % (ref 0–0.5)
LYMPHOCYTES # BLD AUTO: 1.83 10*3/MM3 (ref 0.7–3.1)
LYMPHOCYTES NFR BLD AUTO: 14.4 % (ref 19.6–45.3)
MCH RBC QN AUTO: 30 PG (ref 26.6–33)
MCHC RBC AUTO-ENTMCNC: 33.7 G/DL (ref 31.5–35.7)
MCV RBC AUTO: 88.9 FL (ref 79–97)
MONOCYTES # BLD AUTO: 0.8 10*3/MM3 (ref 0.1–0.9)
MONOCYTES NFR BLD AUTO: 6.3 % (ref 5–12)
NEUTROPHILS NFR BLD AUTO: 10 10*3/MM3 (ref 1.7–7)
NEUTROPHILS NFR BLD AUTO: 78.5 % (ref 42.7–76)
NRBC BLD AUTO-RTO: 0 /100 WBC (ref 0–0.2)
PLATELET # BLD AUTO: 207 10*3/MM3 (ref 140–450)
PMV BLD AUTO: 11.2 FL (ref 6–12)
POTASSIUM SERPL-SCNC: 4.6 MMOL/L (ref 3.5–5.2)
RBC # BLD AUTO: 3.97 10*6/MM3 (ref 3.77–5.28)
SODIUM SERPL-SCNC: 136 MMOL/L (ref 136–145)
WBC NRBC COR # BLD: 12.73 10*3/MM3 (ref 3.4–10.8)

## 2023-06-23 PROCEDURE — 25010000002 HEPARIN (PORCINE) PER 1000 UNITS: Performed by: OBSTETRICS & GYNECOLOGY

## 2023-06-23 PROCEDURE — 80048 BASIC METABOLIC PNL TOTAL CA: CPT | Performed by: OBSTETRICS & GYNECOLOGY

## 2023-06-23 PROCEDURE — 85025 COMPLETE CBC W/AUTO DIFF WBC: CPT | Performed by: OBSTETRICS & GYNECOLOGY

## 2023-06-23 RX ORDER — POLYETHYLENE GLYCOL 3350 17 G/17G
17 POWDER, FOR SOLUTION ORAL DAILY
Qty: 238 G | Refills: 0 | Status: ON HOLD | OUTPATIENT
Start: 2023-06-23 | End: 2023-07-02

## 2023-06-23 RX ORDER — ONDANSETRON 4 MG/1
4 TABLET, FILM COATED ORAL EVERY 6 HOURS PRN
Qty: 20 TABLET | Refills: 0 | Status: SHIPPED | OUTPATIENT
Start: 2023-06-23

## 2023-06-23 RX ORDER — OXYCODONE HYDROCHLORIDE 10 MG/1
5 TABLET ORAL EVERY 4 HOURS PRN
Qty: 5 TABLET | Refills: 0 | Status: SHIPPED | OUTPATIENT
Start: 2023-06-23 | End: 2023-07-05 | Stop reason: HOSPADM

## 2023-06-23 RX ORDER — IBUPROFEN 600 MG/1
600 TABLET ORAL EVERY 6 HOURS PRN
Qty: 30 TABLET | Refills: 1 | Status: SHIPPED | OUTPATIENT
Start: 2023-06-23

## 2023-06-23 RX ORDER — ACETAMINOPHEN 500 MG
500 TABLET ORAL EVERY 6 HOURS PRN
Qty: 30 TABLET | Refills: 2 | Status: SHIPPED | OUTPATIENT
Start: 2023-06-23

## 2023-06-23 RX ADMIN — OXYCODONE HYDROCHLORIDE 5 MG: 5 TABLET ORAL at 08:55

## 2023-06-23 RX ADMIN — FAMOTIDINE 20 MG: 20 TABLET ORAL at 08:48

## 2023-06-23 RX ADMIN — ESCITALOPRAM OXALATE 10 MG: 10 TABLET ORAL at 08:47

## 2023-06-23 RX ADMIN — SODIUM CHLORIDE, POTASSIUM CHLORIDE, SODIUM LACTATE AND CALCIUM CHLORIDE 75 ML/HR: 600; 310; 30; 20 INJECTION, SOLUTION INTRAVENOUS at 06:24

## 2023-06-23 RX ADMIN — ACETAMINOPHEN 650 MG: 325 TABLET ORAL at 12:37

## 2023-06-23 RX ADMIN — SENNOSIDES AND DOCUSATE SODIUM 2 TABLET: 50; 8.6 TABLET ORAL at 08:48

## 2023-06-23 RX ADMIN — GABAPENTIN 100 MG: 100 CAPSULE ORAL at 08:48

## 2023-06-23 RX ADMIN — ACETAMINOPHEN 650 MG: 325 TABLET ORAL at 05:50

## 2023-06-23 RX ADMIN — IBUPROFEN 600 MG: 600 TABLET ORAL at 03:08

## 2023-06-23 RX ADMIN — VALSARTAN 320 MG: 160 TABLET, FILM COATED ORAL at 08:48

## 2023-06-23 RX ADMIN — POLYETHYLENE GLYCOL 3350 17 G: 17 POWDER, FOR SOLUTION ORAL at 08:48

## 2023-06-23 RX ADMIN — LEVOTHYROXINE SODIUM 25 MCG: 0.03 TABLET ORAL at 08:47

## 2023-06-23 RX ADMIN — AMLODIPINE BESYLATE 5 MG: 5 TABLET ORAL at 08:48

## 2023-06-23 RX ADMIN — HEPARIN SODIUM 5000 UNITS: 5000 INJECTION INTRAVENOUS; SUBCUTANEOUS at 05:51

## 2023-06-23 RX ADMIN — HYDROCHLOROTHIAZIDE 12.5 MG: 12.5 CAPSULE ORAL at 08:48

## 2023-06-23 NOTE — DISCHARGE INSTRUCTIONS
1) No driving for 1-2 weeks and no longer taking narcotics.   2) May shower / sponge bathe >24 hours after surgery, No tub baths/soaking  3) Do not lift / push / pull more then 20 lb. for 6 weeks  4) Pelvic rest for 6 weeks  5) Constipation is a common postoperative complaint.  Please use a stool softeners and laxatives as needed to facilitate bowel movements. Miralax prescribed at discharge for as needed use.  6) Dress wound three times daily with interdry dressing provided  7) JAE drain care, measure output.  Call Maryjane [phone] with drain output 6/26/23

## 2023-06-23 NOTE — PLAN OF CARE
Goal Outcome Evaluation:  Plan of Care Reviewed With: patient        Progress: improving  Outcome Evaluation: VSS. Pt ambulated in [place] with standby assist. Tolerated well. Oxicodone given x 1 and Ibuprophen given x 1 last night. JAE draines noted to bilateral groins draining small amout of serosang. fluid. F/C to be removed at 0600 this am. Pt not able to sleep much during the night. Pt refused sleeping aid. Daughter at bedside. Care continued.

## 2023-06-23 NOTE — DISCHARGE SUMMARY
Inpatient Discharge Summary    BRIEF OVERVIEW  Admitting Provider: Maxine Sanches MD  Discharge Provider: Maxine Sanches MD  Primary Care Physician at Discharge: Kathryn Calixto APRN [phone]     Admission Date: 6/22/2023     Discharge Date: 6/23/23    Primary Discharge Diagnosis  Postop care for bilateral inguinofemoral lymph node dissection and radical vulvectomy     Secondary Discharge Diagnosis  Vulvar cancer    Discharge Disposition  Home or Self Care  Code Status at Discharge: full    Active Issues Requiring Follow-up  Vulvar cancer    Outpatient Follow-Up  Future Appointments   Date Time Provider Department Center   7/5/2023  1:00 PM Maxine Sanches MD [facility]         Test Results Pending at Discharge  Pending Labs       Order Current Status    Tissue Pathology Exam In process            DETAILS OF HOSPITAL STAY    Presenting Problem/History of Present Illness  Vulvar cancer [C51.9]      Hospital Course  Patent was admitted as planned for observation following bilateral inguinofemoral lymph node dissection and radical vulvectomy. She was observed overnight, peterson remaining in place. Peterson removed in am on POD1 and patient voided spontaneously prior to discharge. Labs were within normal limits on day of discharge. Vitals stable overnight. Pain was controlled with oral pain medicine and patient was ambulating and tolerating regular diet. Bilateral drains left in place, patient to call office 6/26 with drain output update.  Wound care discussed with patient and niece.    Operative Procedures Performed  Procedure(s):  INGUINOFEMORAL LYMPH NODE DISSECTION BILATERAL  VULVECTOMY RADICAL      Vitals at Discharge  Discharge Condition: good  Heart Rate: 58  Resp: 18  BP: 135/75  Temp: 97.9 °F (36.6 °C)  Weight: 120 kg (264 lb)    I saw and evaluated the patient. I agree with the findings and the plan of care as documented in the note.    Maxine Sanches MD  06/23/23  15:09 EDT

## 2023-06-23 NOTE — PROGRESS NOTES
Gynecologic Oncology   Daily Progress Note  POD#1    Subjective   Patient did well overnight.  Her pain is controlled.  She is not having nausea or emesis.  She is tolerating a regular diet.  She is voiding spontaneously and is passing gas.  She is ambulating.  She is using her incentive spirometer.      No fevers or chills, using IS.  Inpatient Medications:     Current Facility-Administered Medications:     acetaminophen (TYLENOL) tablet 650 mg, 650 mg, Oral, Q6H, Maxine Sanches MD, 650 mg at 06/23/23 0550    amLODIPine (NORVASC) tablet 5 mg, 5 mg, Oral, Daily, Maxine Sanches MD    atorvastatin (LIPITOR) tablet 20 mg, 20 mg, Oral, Nightly, Maxine Sanches MD, 20 mg at 06/22/23 2045    escitalopram (LEXAPRO) tablet 10 mg, 10 mg, Oral, Daily, Maxine Sanches MD    famotidine (PEPCID) tablet 20 mg, 20 mg, Oral, BID AC, Maxine Sanches MD, 20 mg at 06/22/23 1802    gabapentin (NEURONTIN) capsule 100 mg, 100 mg, Oral, TID, Maxine Sanches MD, 100 mg at 06/22/23 2045    heparin (porcine) 5000 UNIT/ML injection 5,000 Units, 5,000 Units, Subcutaneous, Q8H, Maxine Sanches MD, 5,000 Units at 06/23/23 0551    hydroCHLOROthiazide (HYDRODIURIL) oral 12.5 mg, 12.5 mg, Oral, Daily, Maxine Sanches MD    HYDROmorphone (DILAUDID) injection 0.5 mg, 0.5 mg, Intravenous, Q2H PRN **AND** naloxone (NARCAN) injection 0.4 mg, 0.4 mg, Intravenous, Q5 Min PRN, Maxine Sanches MD    HYDROmorphone (DILAUDID) injection 0.5 mg, 0.5 mg, Intravenous, Q2H PRN **AND** naloxone (NARCAN) injection 0.1 mg, 0.1 mg, Intravenous, Q5 Min PRN, Maxine Sanches MD    ibuprofen (ADVIL,MOTRIN) tablet 600 mg, 600 mg, Oral, Q6H PRN, Maxine Sanches MD, 600 mg at 06/23/23 0308    lactated ringers infusion, 75 mL/hr, Intravenous, Continuous, Maxine Sanches MD, Last Rate: 75 mL/hr at 06/22/23 1756, 75 mL/hr at 06/22/23 1756    levothyroxine (SYNTHROID, LEVOTHROID) tablet 25 mcg, 25 mcg, Oral, Daily, Maxine Sanches MD     LORazepam (ATIVAN) tablet 0.5 mg, 0.5 mg, Oral, Q6H PRN, Maxine Sanches MD    nystatin (MYCOSTATIN) powder, , Topical, Q8H PRN, Glenda Dwyer, APRN, Given at 06/22/23 2217    ondansetron (ZOFRAN) tablet 4 mg, 4 mg, Oral, Q6H PRN **OR** ondansetron (ZOFRAN) injection 4 mg, 4 mg, Intravenous, Q6H PRN, Maxine Sanches MD    oxyCODONE (ROXICODONE) immediate release tablet 10 mg, 10 mg, Oral, Q4H PRN, Maxine Sanches MD    oxyCODONE (ROXICODONE) immediate release tablet 5 mg, 5 mg, Oral, Q4H PRN, Maxine Sanches MD, 5 mg at 06/22/23 2257    polyethylene glycol (MIRALAX) packet 17 g, 17 g, Oral, Daily, Maxine Sanches MD    promethazine (PHENERGAN) suppository 12.5 mg, 12.5 mg, Rectal, Q6H PRN **OR** promethazine (PHENERGAN) tablet 12.5 mg, 12.5 mg, Oral, Q6H PRN, Maxine Sanches MD    sennosides-docusate (PERICOLACE) 8.6-50 MG per tablet 2 tablet, 2 tablet, Oral, BID, Maxine Sanches MD, 2 tablet at 06/22/23 2045    simethicone (MYLICON) chewable tablet 80 mg, 80 mg, Oral, 4x Daily PRN, Maxine Sanches MD    temazepam (RESTORIL) capsule 15 mg, 15 mg, Oral, Nightly PRN, Maxine Sanches MD    valsartan (DIOVAN) tablet 320 mg, 320 mg, Oral, Daily, Maxine Sanches MD     Objective   Temp:  [96.5 °F (35.8 °C)-98.8 °F (37.1 °C)] 96.5 °F (35.8 °C)  Heart Rate:  [60-85] 75  Resp:  [16-20] 18  BP: (133-173)/(55-90) 145/73  Vitals:    06/23/23 0422   BP: 145/73   Pulse: 75   Resp: 18   Temp: 96.5 °F (35.8 °C)   SpO2: 92%     I/O last 3 completed shifts:  In: 1200 [I.V.:1200]  Out: 235 [Urine:200; Drains:35]                 GENERAL: Alert, well-appearing female in no apparent distress.    CARDIOVASCULAR: Normal rate, regular rhythm, no murmurs, rubs, or gallops.    RESPIRATORY: Clear to auscultation bilaterally, normal respiratory effort  GASTROINTESTINAL:  Soft, appropriately tender, non-distended, no rebound or guarding.  Positive bowel sounds.   GENITOURINARY: peterson previously removed.     SKIN:  Warm, dry, well-perfused.  Bilateral groin incisions clean dry and intact, drain sites dressed, bilateral drains with bloody drainage, nontender around incision  PSYCHIATRIC: AO x3, with appropriate affect, normal thought processes  EXREMITIES: Symmetric. No peripheral edema.    Lab Results   Component Value Date    WBC 12.73 (H) 06/23/2023    HGB 11.9 (L) 06/23/2023    HCT 35.3 06/23/2023    MCV 88.9 06/23/2023     06/23/2023    NEUTROABS 10.00 (H) 06/23/2023    GLUCOSE 94 05/05/2023    BUN 16 05/05/2023    CREATININE 0.66 05/05/2023     05/05/2023    K 3.6 06/22/2023     05/05/2023    CO2 28.0 05/05/2023    CALCIUM 9.5 05/05/2023         Assessment & Plan   Ellie Rajan is a 62 y.o. female POD1 s/p bilateral inguinofemoral lymph node dissection and radical vulvectmy    1.  Post-operative care  -Routine care (encourage ambulation, IS use, advance diet as tolerated, saline lock IV, bowel regimen, VTE prophylaxis)  -drains to remain in place 1-3 weeks  -pain controlled with oxycodone  -void prior to discharge    2.  Disposition  -likely discharge home today         I saw and evaluated the patient. I agree with the findings and the plan of care as documented in the note.  I personally discussed with JAE care and wound care.  I cut it Interdry at the bedside with the nurse and placed on the wound.  I discussed PeriCare with squirt bottle, cool hair dryer.  Follow-up as previously scheduled.  Pain regimen discussed and patient is to call office if she requires additional pain medication.    Maxine Sanches MD  06/23/23  16:15 EDT

## 2023-06-23 NOTE — CASE MANAGEMENT/SOCIAL WORK
Case Management Discharge Note      [facility] and [facility]'s niece at bedside who is actively [facility]. Patient denied any discharge needs. Plan is home with niece transporting         Selected Continued Care - Admitted Since 6/22/2023       Destination    No services have been selected for the patient.                Durable Medical Equipment    No services have been selected for the patient.                Dialysis/Infusion    No services have been selected for the patient.                Home Medical Care    No services have been selected for the patient.                Therapy    No services have been selected for the patient.                Community Resources    No services have been selected for the patient.                Community & DME    No services have been selected for the patient.                         Final Discharge Disposition Code: 01 - home or self-care

## 2023-06-23 NOTE — PLAN OF CARE
Goal Outcome Evaluation:           Progress: improving  Outcome Evaluation: VSS. RA. Pain controlled with PRN oxy and scheduled tylenol. Ambulating in room. Incisions c/d/i. JAE drain with minimal output. Educated on JAE drain care. Voiding spontaneously. Discharged today. Follow up info given, supplies given, s/s of complication discussed. Med side effects reviewed. Meds received from meds to beds. No further needs identified at this time.

## 2023-06-26 LAB
CYTO UR: NORMAL
LAB AP CASE REPORT: NORMAL
LAB AP CLINICAL INFORMATION: NORMAL
PATH REPORT.FINAL DX SPEC: NORMAL
PATH REPORT.GROSS SPEC: NORMAL

## 2023-07-02 PROBLEM — I10 ESSENTIAL HYPERTENSION: Status: ACTIVE | Noted: 2023-07-02

## 2023-07-02 PROBLEM — E66.813 OBESITY, CLASS III, BMI 40-49.9 (MORBID OBESITY): Status: ACTIVE | Noted: 2023-07-02

## 2023-07-02 PROBLEM — E66.01 OBESITY, CLASS III, BMI 40-49.9 (MORBID OBESITY): Status: ACTIVE | Noted: 2023-07-02

## 2023-07-02 PROBLEM — E78.5 HYPERLIPIDEMIA: Status: ACTIVE | Noted: 2023-07-02

## 2023-07-02 PROBLEM — E03.9 HYPOTHYROIDISM (ACQUIRED): Status: ACTIVE | Noted: 2023-07-02

## 2023-07-02 PROBLEM — A41.9 SEPSIS: Status: ACTIVE | Noted: 2023-07-02

## 2023-07-11 PROBLEM — Z98.890 POST-OPERATIVE STATE: Status: ACTIVE | Noted: 2023-07-11

## 2023-07-12 PROBLEM — B37.2 YEAST DERMATITIS: Status: ACTIVE | Noted: 2023-07-12

## 2023-07-25 ENCOUNTER — READMISSION MANAGEMENT (OUTPATIENT)
Dept: CALL CENTER | Facility: HOSPITAL | Age: 63
End: 2023-07-25
Payer: MEDICAID

## 2023-07-25 NOTE — OUTREACH NOTE
Sepsis Week 3 Survey      Flowsheet Row Responses   [facility] facility patient discharged from? [facility]   Does the patient have one of the following disease processes/diagnoses(primary or secondary)? Sepsis   Week 3 attempt successful? No   Unsuccessful attempts Attempt 1            Coby Jack Registered Nurse

## 2023-07-26 ENCOUNTER — OFFICE VISIT (OUTPATIENT)
Dept: GYNECOLOGIC ONCOLOGY | Facility: CLINIC | Age: 63
End: 2023-07-26
Payer: MEDICAID

## 2023-07-26 VITALS
BODY MASS INDEX: 43.16 KG/M2 | SYSTOLIC BLOOD PRESSURE: 162 MMHG | RESPIRATION RATE: 18 BRPM | OXYGEN SATURATION: 97 % | HEIGHT: 67 IN | TEMPERATURE: 96.8 F | HEART RATE: 75 BPM | DIASTOLIC BLOOD PRESSURE: 72 MMHG | WEIGHT: 275 LBS

## 2023-07-26 DIAGNOSIS — C51.9 VULVAR CANCER: Primary | ICD-10-CM

## 2023-07-26 PROCEDURE — 1125F AMNT PAIN NOTED PAIN PRSNT: CPT | Performed by: OBSTETRICS & GYNECOLOGY

## 2023-07-26 PROCEDURE — 1159F MED LIST DOCD IN RCRD: CPT | Performed by: OBSTETRICS & GYNECOLOGY

## 2023-07-26 PROCEDURE — 3078F DIAST BP <80 MM HG: CPT | Performed by: OBSTETRICS & GYNECOLOGY

## 2023-07-26 PROCEDURE — 1160F RVW MEDS BY RX/DR IN RCRD: CPT | Performed by: OBSTETRICS & GYNECOLOGY

## 2023-07-26 PROCEDURE — 99024 POSTOP FOLLOW-UP VISIT: CPT | Performed by: OBSTETRICS & GYNECOLOGY

## 2023-07-26 PROCEDURE — 3077F SYST BP >= 140 MM HG: CPT | Performed by: OBSTETRICS & GYNECOLOGY

## 2023-07-26 RX ORDER — LORAZEPAM 1 MG/1
1 TABLET ORAL 3 TIMES DAILY
COMMUNITY
Start: 2023-07-11

## 2023-07-26 NOTE — PROGRESS NOTES
"Ellie Rajan  [id number]  1960      Reason for Visit: Stage 1B vulvar SCC, Postoperative evaluation    History of Present Illness:  Patient is a very pleasant 62 y.o. woman who presents for a post operative evaluation status post radical vulvectomy and bilateral inguinofemoral lymph node dissection performed on 6/22/23.      Surgery was uncomplicated. Post-operatively she was not able to successfully keep her groin JAE drains on suction and was admitted from 7/2-7/5 for postoperative cellulitis and meeting SIRS criteria. Wound culture positive for MRSA. She was treated with IV zosyn and vancomycin per ID which was transitioned to PO linezolid and augmentin x7 days total. Both JAE drains have been removed as of last visit.    Reports she had a fall at home after slipping on melted ice cube from spilled ice tea. She fell onto her abdomen and is a little bit sore but otherwise feeling okay. No other injuries sustained. Her pain is well controlled. Reports small amount of drainage from LLQ former drain site.     Past Medical History, Past Surgical History, Social History, Family History have been reviewed and are without significant changes except as mentioned.    Review of Systems   All other systems were reviewed and are negative except as mentioned above.    Medications:  The current medication list was reviewed in the EMR    ALLERGIES:    Allergies   Allergen Reactions    Sulfa Antibiotics Hives         /72   Pulse 75   Temp 96.8 °F (36 °C) (Temporal)   Resp 18   Ht 170.2 cm (67.01\")   Wt 125 kg (275 lb)   SpO2 97%   BMI 43.06 kg/m²   ECOG score: 0       Physical Exam  Constitutional:  Patient is a pleasant woman in no acute distress.  Gastrointestinal: Abdomen is soft and appropriately tender.  There is no mass palpated.  There is no rebound or guarding.  Right and left inguinal incisions clean/dry/intact. Small amount of granulation tissue at medial apex of left inguinal incision and at left JAE " site. No fluctuance of purulent drainage on palpation. Bandage removed and gauze placed in fold. Intertrigo improved from previous.   Extremities: Bilateral lower extremities are non-tender.  Gynecologic: External genitalia are free from lesion. Incisions healing well. No erythema or drainage.    Diagnostics:  No new labs or imaging.      ASSESSMENT/PLAN:  Ellie Rajan is a 63yo with history of Stage 1B vulvar SCC now s/p radical vulvectomy and bilateral inguinofemoral lymph node dissection performed on 6/22/23.   Healing well today. Recommend continued efforts to keep clean/dry with use of clean gauze in folds where moisture accumulates as needed. No further postoperative appointments required. Patient to call with any new postop concerns if needed.    Intertriginous candidiasis: noted in LT groin, anterior vulva. Has been using nystatin powder in these areas and keep them clean and dry. Improved today.    She is to follow-up for her Survivorship appointment in October.  We discussed the possibility of follow-up closer to home and patient is considering undergoing surveillance visits with Dr. Sampson.    Encounter Diagnosis   Name Primary?    Vulvar cancer Yes     Patient was seen and examined with Dr. Gauthier,  resident, who performed portions of the examination and documentation for this patient's care under my direct supervision.  I agree with the above documentation and plan.    Maxine Sanches MD  07/26/23  15:22 EDT

## 2023-10-16 PROBLEM — C51.8 MALIGNANT NEOPLASM OF OVERLAPPING SITES OF VULVA: Status: RESOLVED | Noted: 2023-05-04 | Resolved: 2023-10-16

## 2023-10-17 ENCOUNTER — OFFICE VISIT (OUTPATIENT)
Dept: GYNECOLOGIC ONCOLOGY | Facility: CLINIC | Age: 63
End: 2023-10-17
Payer: MEDICAID

## 2023-10-17 VITALS
SYSTOLIC BLOOD PRESSURE: 141 MMHG | RESPIRATION RATE: 18 BRPM | BODY MASS INDEX: 42.11 KG/M2 | HEART RATE: 67 BPM | DIASTOLIC BLOOD PRESSURE: 65 MMHG | OXYGEN SATURATION: 98 % | TEMPERATURE: 97.3 F | WEIGHT: 268.3 LBS | HEIGHT: 67 IN

## 2023-10-17 DIAGNOSIS — B37.2 YEAST DERMATITIS: ICD-10-CM

## 2023-10-17 DIAGNOSIS — Z85.40 HISTORY OF FEMALE GENITAL CANCER: Primary | ICD-10-CM

## 2023-10-17 DIAGNOSIS — G25.81 RESTLESS LEG SYNDROME: ICD-10-CM

## 2023-10-17 PROCEDURE — 1160F RVW MEDS BY RX/DR IN RCRD: CPT | Performed by: NURSE PRACTITIONER

## 2023-10-17 PROCEDURE — 3078F DIAST BP <80 MM HG: CPT | Performed by: NURSE PRACTITIONER

## 2023-10-17 PROCEDURE — 99215 OFFICE O/P EST HI 40 MIN: CPT | Performed by: NURSE PRACTITIONER

## 2023-10-17 PROCEDURE — 1159F MED LIST DOCD IN RCRD: CPT | Performed by: NURSE PRACTITIONER

## 2023-10-17 PROCEDURE — 1125F AMNT PAIN NOTED PAIN PRSNT: CPT | Performed by: NURSE PRACTITIONER

## 2023-10-17 PROCEDURE — 3077F SYST BP >= 140 MM HG: CPT | Performed by: NURSE PRACTITIONER

## 2023-10-31 PROBLEM — Z98.890 POST-OPERATIVE STATE: Status: RESOLVED | Noted: 2023-07-11 | Resolved: 2023-10-31

## 2023-10-31 NOTE — PROGRESS NOTES
GYN ONCOLOGY CANCER SURVIVORSHIP VISIT    Ellie Rajan  [id number]  1960    Subjective   Chief Complaint: Vulvar Cancer (Survivorship)        History of present illness:     Ellie Rajan is a 63 y.o. year old female who is here today for her Cancer Survivorship visit, see oncology history below. Patient's post-operative course was complicated by MRSA infection requiring inpatient admission and IV antibiotics. Patient reports she has recovered well and is overall completely healed. She notes occasional vulvar tenderness, but denies any pain. She has chronic candidiasis to bilateral groins, currently stable using nystatin cream and powder. Only complaint today is of worsening restless legs at night since her surgery. Another provider has prescribed muscle relaxers for management. These help sometimes, but not always. She inquires about other treatment options.      Cancer History:   Oncology/Hematology History   Vulvar cancer   4/4/2023 Initial Diagnosis    Patient presented to OBGYN office with c/o vulvar itching and burning. Pedunculated left labial mass and right labial fourchette lesion noted on exam. Biopsy revealed at lease squamous cell carcinoma in-situ, suspicious for invasion.   Referred to Gyn Oncology (delayed x 1 month per patient request)     5/15/2023 Cancer Staged    Staging form: Vulva, AJCC 8th Edition  - Clinical stage from 5/15/2023: FIGO Stage IB (cT1b, cN0, cM0) - Signed by Maxine Sanches MD on 6/22/2023     5/15/2023 Procedure    Exam under anesthesia and vulvar biopsy by Dr. Maxine Sanches & Dr. Manpreet Eugene.  Pathology showed invasive squamous cell carcinoma, estimated depth of invasion 4 mm, total tumor size over 2 cm. External consult by Dr. Recinos at [facility].     5/22/2023 Imaging    PET/CT:  1. Focal, somewhat linear area of increased uptake in the region of the vulva, likely consistent with patient's given history of vulvar malignancy.  2. No evidence of metastatic disease is  "identified.     6/22/2023 Surgery    Radical vulvectomy and bilateral inguinofemoral lymph node dissection by Dr. Maxine Sanches.     Surgical pathology showed 5 x 4 x 1.5 cm squamous cell carcinoma. In-situ carcinoma greater than 1 cm closest peripheral margins. Bilateral lymph nodes negative.   Stage IB     6/22/2023 Cancer Staged    Staging form: Vulva, AJCC 8th Edition  - Pathologic stage from 6/22/2023: FIGO Stage IB (pT1b, pN0, cM0) - Signed by Maxine Sanches MD on 7/25/2023 7/2/2023 - 7/5/2023 Other Event    Inpatient hospital admission for post-op cellulitis and meeting SIRS criteria after inability to keep groin JAE drains on suction. Amorphous, soft tissue/fluid area in the left groin (~5 x 2 x 5 cm), consider postoperative hematoma, seroma, developing abscess per CT scan. Wound culture positive for MRSA. Treated with IV Zosyn and Vanc per ID, transitioned to PO linezolid and augmentin x 7 days. JAE drains later removed in office.          The current medication list and allergy list were reviewed and reconciled.     Past Medical History, Past Surgical History, Social History, Family History have been reviewed and are without significant changes except as mentioned.        Review of Systems   Constitutional: Negative.    Gastrointestinal: Negative.    Genitourinary: Negative.    Musculoskeletal:  Positive for back pain (chronic).   Skin:  Positive for rash (chronic candidiasis).   Psychiatric/Behavioral:  Positive for sleep disturbance (r/t RLS).          Objective   Physical Exam  Vital Signs: /65   Pulse 67   Temp 97.3 °F (36.3 °C) (Temporal)   Resp 18   Ht 170.2 cm (67.01\")   Wt 122 kg (268 lb 4.8 oz)   SpO2 98%   BMI 42.01 kg/m²   Vitals:    10/17/23 1311   PainSc:   5   PainLoc: Back  Comment: lower back           General Appearance:  alert, cooperative, no apparent distress and appears stated age   Neurologic/Psych: A&O x 3, gait steady, appropriate affect   Abdomen:   Soft, " non-tender, non-distended, and no organomegaly   Lymph nodes: No cervical, supraclavicular, inguinal adenopathy noted   Extremities: Normal, atraumatic; no clubbing, cyanosis, or edema    Pelvic: External Genitalia  without lesions, inguinal fold candidiasis noted  Vagina  is pink, moist, without lesions.   Cervix  normal, without lesions, no discharge, and no CMT  Uterus  surgically absent and no palpable masses  Ovaries  without palpable masses or fullness  Parametria  smooth  Rectovaginal  Female rectovaginal: deferred     ECOG score: 1             Result Review :   The following data was reviewed by: Glenda Dwyer, CHANCE on 10/17/2023:  Data reviewed : diagnostic imaging, surgeon's notes, surgical pathology    Last imaging study was CT abdomen pelvis 7/2/2023.       Survivorship Needs Assessment:  Nutrition Services  Health history, treatment course, and weight trends reviewed. Discussed nutrition services offered by [facility] including oncology nutrition, diabetes management, general outpatient nutrition, exercise counseling, and weight management. The patient is not in need of referral to nutrition services at this time.     PT/Rehab  Health history, treatment course, and current status. The patient is not in need of physical therapy or rehabilitation services.    Behavioral Health  A post-treatment depression screening has been completed. PHQ-9 results show 0 (No Depression). The patient has previously established mental health care. A new referral is not recommended at this time.       Procedure Note:            Assessment and Plan:     Diagnoses and all orders for this visit:    1. History of vulvar cancer (Primary)    2. Yeast dermatitis    3. Restless leg syndrome        There is no evidence of disease upon today's exam. Plan for every 6 month clinic exams for the first 2 years, then yearly until the 5 year coleman. Pap smears recommended yearly in surveillance. Patient desires to have her surveillance  with Dr. Sultana Sampson closer to home. This has been discussed with Gyn Onc surgeon and is an acceptable plan. She is understanding we will remain available for any problems or complications in the future.     Continue topical nystatin for candidiasis.     Discussed RLS, worse since her surgery per patient. Recommended magnesium and B6 supplements which have been shown to have some benefit. If this and her current muscle relaxers are not helpful, follow-up with PCP for further management.       The patient and I have reviewed her Survivorship Care Plan in detail. We discussed her diagnosis, pathology, histology, all treatments, and ongoing surveillance recommendations. All questions were answered to her satisfaction. She is in agreement with our plan for ongoing surveillance as outlined in her plan. A copy of this document was provided to her at the completion of our visit.  A copy has also been sent to her primary care provider.    Pain assessment was performed today as a part of patient’s care. For patients with pain related to surgery, gynecologic malignancy or cancer treatment, the plan is as noted in the assessment/plan.  For patients with pain not related to these issues, they are to seek any further needed care from a more appropriate provider, such as PCP.        I spent 40 minutes caring for Ellie on this date of service. This time includes time spent by me in the following activities: preparing for the visit, reviewing tests, obtaining and/or reviewing a separately obtained history, performing a medically appropriate examination and/or evaluation, counseling and educating the patient/family/caregiver, documenting information in the medical record, and care coordination.       Follow Up   Return to clinic PRN. Cancer surveillance with Dr. Sampson in 6 months.       Electronically signed by CHANCE Mendoza on 10/17/2023

## 2024-04-17 ENCOUNTER — TELEPHONE (OUTPATIENT)
Dept: GYNECOLOGIC ONCOLOGY | Facility: CLINIC | Age: 64
End: 2024-04-17

## 2024-04-17 NOTE — TELEPHONE ENCOUNTER
Caller: [facility]    Relationship: [facility]    Best call back number: [phone]    What is the best time to reach you: ASAP    Who are you requesting to speak with (clinical staff, provider,  specific staff member):       What was the call regarding: [facility] IS CALLING BECAUSE THE PATIENT IS IN THEIR OFFICE NOW, AND THEY NEED THE MOST RECENT OFFICE NOTE WITH DR ROLLE AND ANY LABS FROM THAT JOSE LUIS FAXED [phone].

## 2024-07-02 ENCOUNTER — TELEPHONE (OUTPATIENT)
Dept: GYNECOLOGIC ONCOLOGY | Facility: CLINIC | Age: 64
End: 2024-07-02
Payer: MEDICAID

## 2024-07-02 NOTE — TELEPHONE ENCOUNTER
Caller: DIONNA    Relationship: PCP OFFICE.     Best call back number: [phone]     What is the best time to reach you: ANYTIME BEFORE 4:30    Who are you requesting to speak with (clinical staff, provider,  specific staff member): NON -CLINICAL    What was the call regarding: CALLING TO SEE IF THE PT EVER HAD A PAP SMEAR WITH US. THEY COULD NOT TELL BY THE NOTES THAT WERE SENT.

## 2025-06-27 ENCOUNTER — TELEPHONE (OUTPATIENT)
Dept: GYNECOLOGIC ONCOLOGY | Facility: CLINIC | Age: 65
End: 2025-06-27

## 2025-06-27 NOTE — TELEPHONE ENCOUNTER
Caller: VANDANA    Relationship: [facility]  DR KIM OFFICE    Best call back number: [phone] JUST ASK FOR VANDANA     What is the best time to reach you: ANYTIME UNTIL 4:30PM.    EXCEPT 12-1PM    Who are you requesting to speak with (clinical staff, provider,  specific staff member): CLINICAL         What was the call regarding:      DR KIM IS WANTING TO REFER PATIENT BACK TO DR ROLLE  , TO BE SEEN FOR HISTORY OF VULVAR CANCER AND PATIENT HAS NEW AGGLUTINATION AND DR KIM AS WELL AS PATIENT IS CONCERNED CANCER MAY HAVE COME BACK     PLEASE ADVISE IF PATIENT IS NEEDING NEW REFERRAL OR IF  CAN MAKE APPT TO BE SEEN , PATIENT WAS LAST SEEN 10/17/2023

## 2025-07-08 ENCOUNTER — OFFICE VISIT (OUTPATIENT)
Dept: GYNECOLOGIC ONCOLOGY | Facility: CLINIC | Age: 65
End: 2025-07-08
Payer: MEDICARE

## 2025-07-08 VITALS
DIASTOLIC BLOOD PRESSURE: 65 MMHG | RESPIRATION RATE: 17 BRPM | HEIGHT: 67 IN | TEMPERATURE: 97.3 F | SYSTOLIC BLOOD PRESSURE: 146 MMHG | HEART RATE: 83 BPM | OXYGEN SATURATION: 98 % | WEIGHT: 280.6 LBS | BODY MASS INDEX: 44.04 KG/M2

## 2025-07-08 DIAGNOSIS — N90.89 VULVAR IRRITATION: ICD-10-CM

## 2025-07-08 DIAGNOSIS — Z85.44 HISTORY OF CANCER OF VULVA: Primary | ICD-10-CM

## 2025-07-08 PROCEDURE — 99213 OFFICE O/P EST LOW 20 MIN: CPT | Performed by: OBSTETRICS & GYNECOLOGY

## 2025-07-08 PROCEDURE — 1160F RVW MEDS BY RX/DR IN RCRD: CPT | Performed by: OBSTETRICS & GYNECOLOGY

## 2025-07-08 PROCEDURE — 1159F MED LIST DOCD IN RCRD: CPT | Performed by: OBSTETRICS & GYNECOLOGY

## 2025-07-08 PROCEDURE — 1125F AMNT PAIN NOTED PAIN PRSNT: CPT | Performed by: OBSTETRICS & GYNECOLOGY

## 2025-07-08 PROCEDURE — 3078F DIAST BP <80 MM HG: CPT | Performed by: OBSTETRICS & GYNECOLOGY

## 2025-07-08 PROCEDURE — 3077F SYST BP >= 140 MM HG: CPT | Performed by: OBSTETRICS & GYNECOLOGY

## 2025-07-08 NOTE — PROGRESS NOTES
Ellie Rajan  [id number]  1960      Reason for visit: History of vulvar cancer, vulvar pruritus    Consultation:  Patient is being seen at the request of Dr. Harry    History of present illness:  The patient is a 64 y.o. year old female who presents today for treatment and evaluation of the above issues.    Patient reports a 4-month history of labial agglutination and increasing irritation.  She thinks irritation is exacerbated by urination.  Patient has chronic urinary incontinence and some vaginal discharge without bleeding.  She is otherwise in her usual state of health.  She has been lost to follow-up and would prefer follow-up closer to home.      Oncologic History:  Oncology/Hematology History   Vulvar cancer   4/4/2023 Initial Diagnosis    Patient presented to OBGYN office with c/o vulvar itching and burning. Pedunculated left labial mass and right labial fourchette lesion noted on exam. Biopsy revealed at lease squamous cell carcinoma in-situ, suspicious for invasion.   Referred to Gyn Oncology (delayed x 1 month per patient request)     5/15/2023 Cancer Staged    Staging form: Vulva, AJCC 8th Edition  - Clinical stage from 5/15/2023: FIGO Stage IB (cT1b, cN0, cM0) - Signed by Maxine Sanches MD on 6/22/2023     5/15/2023 Procedure    Exam under anesthesia and vulvar biopsy by Dr. Maxine Sanches & Dr. Manpreet Eugene.  Pathology showed invasive squamous cell carcinoma, estimated depth of invasion 4 mm, total tumor size over 2 cm. External consult by Dr. Recinos at [facility].     5/22/2023 Imaging    PET/CT:  1. Focal, somewhat linear area of increased uptake in the region of the vulva, likely consistent with patient's given history of vulvar malignancy.  2. No evidence of metastatic disease is identified.     6/22/2023 Surgery    Radical vulvectomy and bilateral inguinofemoral lymph node dissection by Dr. Maxine Sanches.     Surgical pathology showed 5 x 4 x 1.5 cm squamous cell carcinoma. In-situ  carcinoma greater than 1 cm closest peripheral margins. Bilateral lymph nodes negative.   Stage IB     6/22/2023 Cancer Staged    Staging form: Vulva, AJCC 8th Edition  - Pathologic stage from 6/22/2023: FIGO Stage IB (pT1b, pN0, cM0) - Signed by Maxine Sanches MD on 7/25/2023 7/2/2023 - 7/5/2023 Other Event    Inpatient hospital admission for post-op cellulitis and meeting SIRS criteria after inability to keep groin JAE drains on suction. Amorphous, soft tissue/fluid area in the left groin (~5 x 2 x 5 cm), consider postoperative hematoma, seroma, developing abscess per CT scan. Wound culture positive for MRSA. Treated with IV Zosyn and Vanc per ID, transitioned to PO linezolid and augmentin x 7 days. JAE drains later removed in office.            Past Medical History:   Diagnosis Date    Anesthesia complication     aspirated    Anxiety     Depression     Fatigue     GERD (gastroesophageal reflux disease)     History of COVID-19 2021 no hospital , oral steroids    Hyperlipidemia     Hypertension     Migraine     Ovarian cyst     Urinary tract infection     Vulvar cancer 05/15/2023    Wears reading eyeglasses        Past Surgical History:   Procedure Laterality Date    BREAST SURGERY Right     benign biopsy    CHOLECYSTECTOMY      EXAM UNDER ANESTHESIA N/A 05/15/2023    Procedure: EXAM UNDER ANESTHESIA;  Surgeon: Maxine Sanches MD;  Location:  [facility];  Service: Gynecology Oncology;  Laterality: N/A;    GROIN DISSECTION Bilateral 06/22/2023    Procedure: INGUINOFEMORAL LYMPH NODE DISSECTION BILATERAL;  Surgeon: Maxine Sanches MD;  Location:  [facility];  Service: Gynecology Oncology;  Laterality: Bilateral;    LAPAROSCOPIC CHOLECYSTECTOMY      TONSILLECTOMY      VAGINAL BIOPSY N/A 05/15/2023    Procedure: VULVAR BIOPSY;  Surgeon: Maxine Sanches MD;  Location:  [facility];  Service: Gynecology Oncology;  Laterality: N/A;    VULVA SURGERY      [facility]    VULVECTOMY N/A 06/22/2023    Procedure:  VULVECTOMY RADICAL;  Surgeon: Maxine Sanches MD;  [facility];  Service: Gynecology Oncology;  Laterality: N/A;       MEDICATIONS:    Current Outpatient Medications:     amLODIPine (NORVASC) 5 MG tablet, Take 1 tablet by mouth Daily., Disp: , Rfl:     atorvastatin (LIPITOR) 20 MG tablet, Take 1 tablet by mouth every night at bedtime., Disp: , Rfl:     Cholecalciferol (VITAMIN D3 PO), Take 1 tablet by mouth Daily., Disp: , Rfl:     Cyanocobalamin (VITAMIN B 12 PO), Take 1 tablet by mouth Daily., Disp: , Rfl:     hydroCHLOROthiazide (HYDRODIURIL) 12.5 MG tablet, Take 1 tablet by mouth Daily., Disp: , Rfl:     ibuprofen (ADVIL,MOTRIN) 600 MG tablet, Take 1 tablet by mouth Every 6 (Six) Hours As Needed for Mild Pain., Disp: 30 tablet, Rfl: 1    levothyroxine (SYNTHROID, LEVOTHROID) 25 MCG tablet, Take 1 tablet by mouth Daily., Disp: , Rfl:     LORazepam (ATIVAN) 1 MG tablet, Take 1 tablet by mouth 3 (Three) Times a Day., Disp: , Rfl:     nystatin (MYCOSTATIN) 209852 UNIT/GM powder, apply to affected area twice a day, Disp: , Rfl:     Omega-3 Fatty Acids (fish oil) 1000 MG capsule capsule, Take 2 capsules by mouth Every 12 (Twelve) Hours., Disp: , Rfl:     valsartan (DIOVAN) 320 MG tablet, Take 1 tablet by mouth Daily., Disp: , Rfl:      Allergies:  is allergic to sulfa antibiotics.    Social History:   Social History     Socioeconomic History    Marital status: Single   Tobacco Use    Smoking status: Former     Current packs/day: 0.00     Average packs/day: 1.5 packs/day for 30.0 years (45.0 ttl pk-yrs)     Types: Cigarettes     Start date:      Quit date: 2019     Years since quittin.5    Smokeless tobacco: Never   Vaping Use    Vaping status: Never Used   Substance and Sexual Activity    Alcohol use: Not Currently    Drug use: Never    Sexual activity: Not Currently       Family History:    Family History   Problem Relation Age of Onset    COPD Father     Stroke Father     Stroke Mother      "Hypertension Mother     Urinary tract infection Mother     Hypertension Brother     COPD Sister        Health Maintenance:    Health Maintenance   Topic Date Due    LIPID PANEL  Never done    PAP SMEAR  Never done    MAMMOGRAM  Never done    COLORECTAL CANCER SCREENING  Never done    Pneumococcal Vaccine 50+ (1 of 1 - PCV) Never done    ZOSTER VACCINE (1 of 2) Never done    LUNG CANCER SCREENING  Never done    TDAP/TD VACCINES (3 - Tdap) 07/24/2013    HEPATITIS C SCREENING  Never done    ANNUAL WELLNESS VISIT  Never done    COVID-19 Vaccine (3 - 2024-25 season) 09/01/2024    INFLUENZA VACCINE  10/01/2025       Review of Systems:  Please refer to history of present illness.  Review of systems otherwise negative.  Physical Exam:  Vitals:    07/08/25 1511   BP: 146/65   Pulse: 83   Resp: 17   Temp: 97.3 °F (36.3 °C)   TempSrc: Temporal   SpO2: 98%   Weight: 127 kg (280 lb 9.6 oz)   Height: 170.2 cm (67.01\")   PainSc: 4    PainLoc: Shoulder     Body mass index is 43.94 kg/m².  Wt Readings from Last 3 Encounters:   07/08/25 127 kg (280 lb 9.6 oz)   10/17/23 122 kg (268 lb 4.8 oz)   07/26/23 125 kg (275 lb)     PHQ-9 Depression Screening  Little interest or pleasure in doing things?     Feeling down, depressed, or hopeless?     PHQ-2 Total Score     Trouble falling or staying asleep, or sleeping too much?     Feeling tired or having little energy?     Poor appetite or overeating?     Feeling bad about yourself - or that you are a failure or have let yourself or your family down?     Trouble concentrating on things, such as reading the newspaper or watching television?     Moving or speaking so slowly that other people could have noticed? Or the opposite - being so fidgety or restless that you have been moving around a lot more than usual?       Thoughts that you would be better off dead, or of hurting yourself in some way?     PHQ-9 Total Score     If you checked off any problems, how difficult have these problems made it " "for you to do your work, take care of things at home, or get along with other people?         GENERAL: Alert, chronically ill-appearing female appearing her stated age who is in no apparent distress.   HEENT: Sclera anicteric. Head normocephalic, atraumatic. Mucus membranes moist.   CARDIOVASCULAR: + trace BLE peripheral edema.  RESPIRATORY:  normal respiratory effort  GASTROINTESTINAL:  Abdomen is obese  SKIN:  Warm, dry, well-perfused.  All visible areas intact.  No rashes, lesions, ulcers.  PSYCHIATRIC: AO x3, with appropriate affect, normal thought processes.  MUSCULOSKELETAL: Normal gait and station.   EXTREMITIES:   No cyanosis, clubbing, symmetric.  LYMPHATICS:  No inguinal adenopathy noted.     PELVIC exam:    Physical Exam  Genitourinary:      Genitourinary Comments: Red = scar  Black = severely stenotic vaginal introitus             Anterior aspect of scar with biopsy site  Evidence of chronic irritation without distinct lesion    ECOG PS 2    PROCEDURES:  None    Diagnostic Data:    No Images in the past 120 days found..    Lab Results   Component Value Date    WBC 7.76 07/05/2023    HGB 10.6 (L) 07/05/2023    HCT 33.5 (L) 07/05/2023    MCV 92.0 07/05/2023     07/05/2023    NEUTROABS 4.57 07/05/2023    GLUCOSE 104 (H) 07/05/2023    BUN 11 07/05/2023    CREATININE 0.68 07/05/2023     07/05/2023    K 3.6 07/05/2023     07/05/2023    CO2 25.0 07/05/2023    MG 1.8 07/03/2023    PHOS 4.1 07/02/2023    CALCIUM 8.4 (L) 07/05/2023    ALBUMIN 4.0 07/01/2023    AST 18 07/01/2023    ALT 28 07/01/2023    BILITOT 0.5 07/01/2023     No results found for: \"TSH\"  No results found for: \"FT4\"  No results found for: \"\"    Assessment & Plan   This is a 64 y.o. woman with history of vulvar cancer with vulvar irritation   Encounter Diagnoses   Name Primary?    History of cancer of vulva Yes    Vulvar irritation      No suspicious lesions noted today  Chronic irritation likely related to urinary " leakage  Discussed moisture barrier, ok to continue to use topical estrogen  Advised that if anything worsened she would need to undergo EUA as vaginal stenosis precludes complete evaluation    Pain assessment was performed today as a part of patient’s care.  For patients with pain related to surgery, gynecologic malignancy or cancer treatment, the plan is as noted in the assessment/plan.  For patients with pain not related to these issues, they are to seek any further needed care from a more appropriate provider, such as PCP.      No orders of the defined types were placed in this encounter.      FOLLOW UP: PRN here, but recommended follow up with Dr. Harry for repeat evaluation in 3-6 months    I spent 22 minutes caring for Ellie on this date of service. This time includes time spent by me in the following activities: preparing for the visit, reviewing tests, performing a medically appropriate examination and/or evaluation, counseling and educating the patient/family/caregiver, referring and communicating with other health care professionals, documenting information in the medical record, and care coordination    Electronically Signed by: Maxine Sanches MD  Date: 7/8/2025

## (undated) DEVICE — SCRB SURG BACTOSHIELD CHG 4PCT 4OZ

## (undated) DEVICE — LEGGINGS, PAIR, 29X43, STERILE: Brand: MEDLINE

## (undated) DEVICE — DRAIN JACKSON PRATT ROUND 15FR: Brand: CARDINAL HEALTH

## (undated) DEVICE — PK MINOR SPLT 10

## (undated) DEVICE — IRRIGATOR BULB ASEPTO 60CC STRL

## (undated) DEVICE — LEX D AND C: Brand: MEDLINE INDUSTRIES, INC.

## (undated) DEVICE — UNDERGLV SURG BIOGEL INDICATOR LTX PF 7

## (undated) DEVICE — SHEET, DRAPE, SPLIT, STERILE: Brand: MEDLINE

## (undated) DEVICE — DRSNG TELFA PAD NONADH STR 1S 3X8IN

## (undated) DEVICE — TBG PENCL TELESCP MEGADYNE SMOKE EVAC 10FT

## (undated) DEVICE — ANTIBACTERIAL UNDYED BRAIDED (POLYGLACTIN 910), SYNTHETIC ABSORBABLE SUTURE: Brand: COATED VICRYL

## (undated) DEVICE — DEVICE CLASSIFICATION NAME	SYSTEM, X-RAY, MAMMOGRAPHIC510(K) NUMBER	K891090: Brand: SPECBOARD

## (undated) DEVICE — TRAP FLD MINIVAC MEGADYNE 100ML

## (undated) DEVICE — LEX VAGINAL HYSTERECTOMY: Brand: MEDLINE INDUSTRIES, INC.

## (undated) DEVICE — CVR HNDL LIGHT RIGID

## (undated) DEVICE — SURGUARD3 SAFETY HYPODERMIC NEEDLE: Brand: SURGUARD3

## (undated) DEVICE — SUT SILK 2/0 PS 18IN 1588H

## (undated) DEVICE — GLV SURG SENSICARE PI MIC PF SZ6 LF STRL

## (undated) DEVICE — DISH PETRI 3.5IN MD STRL LF

## (undated) DEVICE — SUT MNCRYL PLS ANTIB UD 3/0 PS2 27IN

## (undated) DEVICE — ADHS SKIN PREMIERPRO EXOFIN TOPICAL HI/VISC .5ML

## (undated) DEVICE — SYS CLS SKIN PREMIERPRO EXOFINFUSION 22CM

## (undated) DEVICE — UNDERGLV SURG BIOGEL INDICAT PF 61/2 GRN

## (undated) DEVICE — TUBING, SUCTION, 1/4" X 10', STRAIGHT: Brand: MEDLINE

## (undated) DEVICE — SUT VIC 3/0 TIES J104T

## (undated) DEVICE — PANTY PRE/OP MODEST 1/SZ BLK DISP

## (undated) DEVICE — INTENDED FOR TISSUE SEPARATION, AND OTHER PROCEDURES THAT REQUIRE A SHARP SURGICAL BLADE TO PUNCTURE OR CUT.: Brand: BARD-PARKER ® STAINLESS STEEL BLADES

## (undated) DEVICE — GLV SURG SENSICARE PI MIC PF SZ6.5 LF STRL

## (undated) DEVICE — APPL CHLORAPREP TINTED 26ML TEAL

## (undated) DEVICE — HARMONIC FOCUS SHEARS 9CM LENGTH + ADAPTIVE TISSUE TECHNOLOGY FOR USE WITH BLUE HAND PIECE ONLY: Brand: HARMONIC FOCUS

## (undated) DEVICE — PACK,COLD,STANDARD,OB-PAD,4.5X14.25: Brand: MEDLINE

## (undated) DEVICE — JACKSON-PRATT 100CC BULB RESERVOIR: Brand: CARDINAL HEALTH

## (undated) DEVICE — MEDI-VAC YANKAUER SUCTION HANDLE W/BULBOUS TIP: Brand: CARDINAL HEALTH